# Patient Record
Sex: FEMALE | Race: OTHER | HISPANIC OR LATINO | URBAN - METROPOLITAN AREA
[De-identification: names, ages, dates, MRNs, and addresses within clinical notes are randomized per-mention and may not be internally consistent; named-entity substitution may affect disease eponyms.]

---

## 2020-07-13 ENCOUNTER — EMERGENCY (EMERGENCY)
Facility: HOSPITAL | Age: 20
LOS: 1 days | Discharge: HOME | End: 2020-07-13
Attending: EMERGENCY MEDICINE | Admitting: EMERGENCY MEDICINE
Payer: MEDICAID

## 2020-07-13 VITALS
RESPIRATION RATE: 18 BRPM | TEMPERATURE: 98 F | DIASTOLIC BLOOD PRESSURE: 71 MMHG | HEART RATE: 90 BPM | SYSTOLIC BLOOD PRESSURE: 113 MMHG | OXYGEN SATURATION: 100 %

## 2020-07-13 VITALS
DIASTOLIC BLOOD PRESSURE: 85 MMHG | RESPIRATION RATE: 18 BRPM | HEART RATE: 108 BPM | OXYGEN SATURATION: 100 % | SYSTOLIC BLOOD PRESSURE: 126 MMHG | TEMPERATURE: 98 F

## 2020-07-13 LAB
ALBUMIN SERPL ELPH-MCNC: 4.4 G/DL — SIGNIFICANT CHANGE UP (ref 3.3–5)
ALP SERPL-CCNC: 81 U/L — SIGNIFICANT CHANGE UP (ref 40–120)
ALT FLD-CCNC: 18 U/L — SIGNIFICANT CHANGE UP (ref 4–33)
ANION GAP SERPL CALC-SCNC: 14 MMO/L — SIGNIFICANT CHANGE UP (ref 7–14)
APPEARANCE UR: SIGNIFICANT CHANGE UP
AST SERPL-CCNC: 20 U/L — SIGNIFICANT CHANGE UP (ref 4–32)
BACTERIA # UR AUTO: HIGH
BILIRUB SERPL-MCNC: 0.3 MG/DL — SIGNIFICANT CHANGE UP (ref 0.2–1.2)
BILIRUB UR-MCNC: NEGATIVE — SIGNIFICANT CHANGE UP
BLD GP AB SCN SERPL QL: NEGATIVE — SIGNIFICANT CHANGE UP
BLOOD UR QL VISUAL: NEGATIVE — SIGNIFICANT CHANGE UP
BUN SERPL-MCNC: 7 MG/DL — SIGNIFICANT CHANGE UP (ref 7–23)
CALCIUM SERPL-MCNC: 9.8 MG/DL — SIGNIFICANT CHANGE UP (ref 8.4–10.5)
CHLORIDE SERPL-SCNC: 101 MMOL/L — SIGNIFICANT CHANGE UP (ref 98–107)
CO2 SERPL-SCNC: 21 MMOL/L — LOW (ref 22–31)
COLOR SPEC: YELLOW — SIGNIFICANT CHANGE UP
CREAT SERPL-MCNC: 0.74 MG/DL — SIGNIFICANT CHANGE UP (ref 0.5–1.3)
GLUCOSE SERPL-MCNC: 89 MG/DL — SIGNIFICANT CHANGE UP (ref 70–99)
GLUCOSE UR-MCNC: NEGATIVE — SIGNIFICANT CHANGE UP
HCG SERPL-ACNC: SIGNIFICANT CHANGE UP MIU/ML
HCT VFR BLD CALC: 38.8 % — SIGNIFICANT CHANGE UP (ref 34.5–45)
HGB BLD-MCNC: 12.7 G/DL — SIGNIFICANT CHANGE UP (ref 11.5–15.5)
KETONES UR-MCNC: SIGNIFICANT CHANGE UP
LEUKOCYTE ESTERASE UR-ACNC: SIGNIFICANT CHANGE UP
MCHC RBC-ENTMCNC: 28.9 PG — SIGNIFICANT CHANGE UP (ref 27–34)
MCHC RBC-ENTMCNC: 32.7 % — SIGNIFICANT CHANGE UP (ref 32–36)
MCV RBC AUTO: 88.2 FL — SIGNIFICANT CHANGE UP (ref 80–100)
NITRITE UR-MCNC: POSITIVE — HIGH
NRBC # FLD: 0 K/UL — SIGNIFICANT CHANGE UP (ref 0–0)
PH UR: 6 — SIGNIFICANT CHANGE UP (ref 5–8)
PLATELET # BLD AUTO: 383 K/UL — SIGNIFICANT CHANGE UP (ref 150–400)
PMV BLD: 9.6 FL — SIGNIFICANT CHANGE UP (ref 7–13)
POTASSIUM SERPL-MCNC: 4.5 MMOL/L — SIGNIFICANT CHANGE UP (ref 3.5–5.3)
POTASSIUM SERPL-SCNC: 4.5 MMOL/L — SIGNIFICANT CHANGE UP (ref 3.5–5.3)
PROT SERPL-MCNC: 7.2 G/DL — SIGNIFICANT CHANGE UP (ref 6–8.3)
PROT UR-MCNC: 10 — SIGNIFICANT CHANGE UP
RBC # BLD: 4.4 M/UL — SIGNIFICANT CHANGE UP (ref 3.8–5.2)
RBC # FLD: 11.9 % — SIGNIFICANT CHANGE UP (ref 10.3–14.5)
RBC CASTS # UR COMP ASSIST: SIGNIFICANT CHANGE UP (ref 0–?)
RH IG SCN BLD-IMP: POSITIVE — SIGNIFICANT CHANGE UP
SODIUM SERPL-SCNC: 136 MMOL/L — SIGNIFICANT CHANGE UP (ref 135–145)
SP GR SPEC: 1.02 — SIGNIFICANT CHANGE UP (ref 1–1.04)
SQUAMOUS # UR AUTO: SIGNIFICANT CHANGE UP
UROBILINOGEN FLD QL: NORMAL — SIGNIFICANT CHANGE UP
WBC # BLD: 9.02 K/UL — SIGNIFICANT CHANGE UP (ref 3.8–10.5)
WBC # FLD AUTO: 9.02 K/UL — SIGNIFICANT CHANGE UP (ref 3.8–10.5)
WBC UR QL: HIGH (ref 0–?)

## 2020-07-13 PROCEDURE — 76817 TRANSVAGINAL US OBSTETRIC: CPT | Mod: 26

## 2020-07-13 PROCEDURE — 99284 EMERGENCY DEPT VISIT MOD MDM: CPT

## 2020-07-13 RX ORDER — CEPHALEXIN 500 MG
1 CAPSULE ORAL
Qty: 14 | Refills: 0
Start: 2020-07-13 | End: 2020-07-19

## 2020-07-13 NOTE — ED PROVIDER NOTE - NSFOLLOWUPINSTRUCTIONS_ED_ALL_ED_FT
Follow up with OB/GYN in the next 1-2 weeks  I have attached referral number [phone]  Return for bleeding, increasing pain.  Take keflex 500mg tab twice a day for the next week.  Return for fevers, vomiting, or any other complaints in which you feel you require immediate care.

## 2020-07-13 NOTE — ED ADULT TRIAGE NOTE - CHIEF COMPLAINT QUOTE
pt c/o pelvic pain. reports +pregnancy test yesterday. LMP 5/29. denies vaginal bleeding. pt is hearing impaired. requested her mother to stay for interpretation.

## 2020-07-13 NOTE — ED ADULT NURSE NOTE - NSIMPLEMENTINTERV_GEN_ALL_ED
Implemented All Universal Safety Interventions:  [place] to call system. Call bell, personal items and telephone within reach. Instruct patient to call for assistance. Room bathroom lighting operational. Non-slip footwear when patient is off stretcher. Physically safe environment: no spills, clutter or unnecessary equipment. Stretcher in lowest position, wheels locked, appropriate side rails in place.

## 2020-07-13 NOTE — ED ADULT NURSE NOTE - OBJECTIVE STATEMENT
Pt with co pelvic pain x few days . Pt denies vaginal bleeding no nausea or vomiting seen. vs wnl iv placed blood and urine collected. Pt awaiting u/s. Pt is hearing impaired parent at bedside. Pt does not appear uncomfortable .

## 2020-07-13 NOTE — ED PROVIDER NOTE - PATIENT PORTAL LINK FT
You can access the FollowMyHealth Patient Portal offered by [facility] by registering at the following website: [email or web address]. By joining [email or web address]’s FollowMyHealth portal, you will also be able to view your health information using other applications (apps) compatible with our system.

## 2020-07-13 NOTE — ED PROVIDER NOTE - OBJECTIVE STATEMENT
20 yr old female with no sig PMH is [place], mother speaks Occitan and can communicate with pt and  services used.  Pt noted she was 2-3 weeks late in having menstrual cycle and took home pregnancy test which was positive.  Complaint of lower abdominal pain.  Denies dysuria, hematuria, vaginal bleeding.

## 2020-08-11 ENCOUNTER — LABORATORY RESULT (OUTPATIENT)
Age: 20
End: 2020-08-11

## 2020-08-11 ENCOUNTER — RESULT REVIEW (OUTPATIENT)
Age: 20
End: 2020-08-11

## 2020-08-11 ENCOUNTER — NON-APPOINTMENT (OUTPATIENT)
Age: 20
End: 2020-08-11

## 2020-08-11 ENCOUNTER — APPOINTMENT (OUTPATIENT)
Dept: OBGYN | Facility: HOSPITAL | Age: 20
End: 2020-08-11

## 2020-08-11 ENCOUNTER — OUTPATIENT (OUTPATIENT)
Dept: OUTPATIENT SERVICES | Facility: HOSPITAL | Age: 20
LOS: 1 days | End: 2020-08-11
Payer: MEDICAID

## 2020-08-11 VITALS
TEMPERATURE: 97.5 F | DIASTOLIC BLOOD PRESSURE: 89 MMHG | SYSTOLIC BLOOD PRESSURE: 123 MMHG | BODY MASS INDEX: 30.78 KG/M2 | HEART RATE: 112 BPM | WEIGHT: 163 LBS | HEIGHT: 61 IN

## 2020-08-11 VITALS
SYSTOLIC BLOOD PRESSURE: 104 MMHG | HEART RATE: 112 BPM | DIASTOLIC BLOOD PRESSURE: 68 MMHG | BODY MASS INDEX: 30.78 KG/M2 | TEMPERATURE: 97.5 F | HEIGHT: 61 IN | WEIGHT: 163 LBS

## 2020-08-11 DIAGNOSIS — Z86.69 PERSONAL HISTORY OF OTHER DISEASES OF THE NERVOUS SYSTEM AND SENSE ORGANS: ICD-10-CM

## 2020-08-11 LAB
ALBUMIN SERPL ELPH-MCNC: 4.6 G/DL — SIGNIFICANT CHANGE UP (ref 3.3–5)
ALP SERPL-CCNC: 76 U/L — SIGNIFICANT CHANGE UP (ref 40–120)
ALT FLD-CCNC: 26 U/L — SIGNIFICANT CHANGE UP (ref 4–33)
ANION GAP SERPL CALC-SCNC: 21 MMO/L — HIGH (ref 7–14)
APPEARANCE UR: CLEAR — SIGNIFICANT CHANGE UP
AST SERPL-CCNC: 23 U/L — SIGNIFICANT CHANGE UP (ref 4–32)
BACTERIA # UR AUTO: SIGNIFICANT CHANGE UP
BASOPHILS # BLD AUTO: 0.02 K/UL — SIGNIFICANT CHANGE UP (ref 0–0.2)
BASOPHILS NFR BLD AUTO: 0.2 % — SIGNIFICANT CHANGE UP (ref 0–2)
BILIRUB SERPL-MCNC: 0.3 MG/DL — SIGNIFICANT CHANGE UP (ref 0.2–1.2)
BILIRUB UR-MCNC: NEGATIVE — SIGNIFICANT CHANGE UP
BLD GP AB SCN SERPL QL: NEGATIVE — SIGNIFICANT CHANGE UP
BLOOD UR QL VISUAL: NEGATIVE — SIGNIFICANT CHANGE UP
BUN SERPL-MCNC: 8 MG/DL — SIGNIFICANT CHANGE UP (ref 7–23)
CALCIUM SERPL-MCNC: 10.1 MG/DL — SIGNIFICANT CHANGE UP (ref 8.4–10.5)
CHLORIDE SERPL-SCNC: 98 MMOL/L — SIGNIFICANT CHANGE UP (ref 98–107)
CO2 SERPL-SCNC: 19 MMOL/L — LOW (ref 22–31)
COLOR SPEC: YELLOW — SIGNIFICANT CHANGE UP
CREAT SERPL-MCNC: 0.53 MG/DL — SIGNIFICANT CHANGE UP (ref 0.5–1.3)
EOSINOPHIL # BLD AUTO: 0.05 K/UL — SIGNIFICANT CHANGE UP (ref 0–0.5)
EOSINOPHIL NFR BLD AUTO: 0.5 % — SIGNIFICANT CHANGE UP (ref 0–6)
GLUCOSE SERPL-MCNC: 41 MG/DL — CRITICAL LOW (ref 70–99)
GLUCOSE UR-MCNC: NEGATIVE — SIGNIFICANT CHANGE UP
HCT VFR BLD CALC: 40.5 % — SIGNIFICANT CHANGE UP (ref 34.5–45)
HGB BLD-MCNC: 13.1 G/DL — SIGNIFICANT CHANGE UP (ref 11.5–15.5)
HIV COMBO RESULT: SIGNIFICANT CHANGE UP
HIV1+2 AB SPEC QL: SIGNIFICANT CHANGE UP
IMM GRANULOCYTES NFR BLD AUTO: 0.2 % — SIGNIFICANT CHANGE UP (ref 0–1.5)
KETONES UR-MCNC: NEGATIVE — SIGNIFICANT CHANGE UP
LEUKOCYTE ESTERASE UR-ACNC: NEGATIVE — SIGNIFICANT CHANGE UP
LYMPHOCYTES # BLD AUTO: 2.47 K/UL — SIGNIFICANT CHANGE UP (ref 1–3.3)
LYMPHOCYTES # BLD AUTO: 24.9 % — SIGNIFICANT CHANGE UP (ref 13–44)
MCHC RBC-ENTMCNC: 30 PG — SIGNIFICANT CHANGE UP (ref 27–34)
MCHC RBC-ENTMCNC: 32.3 % — SIGNIFICANT CHANGE UP (ref 32–36)
MCV RBC AUTO: 92.7 FL — SIGNIFICANT CHANGE UP (ref 80–100)
MONOCYTES # BLD AUTO: 0.61 K/UL — SIGNIFICANT CHANGE UP (ref 0–0.9)
MONOCYTES NFR BLD AUTO: 6.1 % — SIGNIFICANT CHANGE UP (ref 2–14)
NEUTROPHILS # BLD AUTO: 6.76 K/UL — SIGNIFICANT CHANGE UP (ref 1.8–7.4)
NEUTROPHILS NFR BLD AUTO: 68.1 % — SIGNIFICANT CHANGE UP (ref 43–77)
NITRITE UR-MCNC: NEGATIVE — SIGNIFICANT CHANGE UP
NRBC # FLD: 0 K/UL — SIGNIFICANT CHANGE UP (ref 0–0)
PH UR: 5.5 — SIGNIFICANT CHANGE UP (ref 5–8)
PLATELET # BLD AUTO: 372 K/UL — SIGNIFICANT CHANGE UP (ref 150–400)
PMV BLD: 9.9 FL — SIGNIFICANT CHANGE UP (ref 7–13)
POTASSIUM SERPL-MCNC: 3.2 MMOL/L — LOW (ref 3.5–5.3)
POTASSIUM SERPL-SCNC: 3.2 MMOL/L — LOW (ref 3.5–5.3)
PROT SERPL-MCNC: 7.9 G/DL — SIGNIFICANT CHANGE UP (ref 6–8.3)
PROT UR-MCNC: 20 — SIGNIFICANT CHANGE UP
RBC # BLD: 4.37 M/UL — SIGNIFICANT CHANGE UP (ref 3.8–5.2)
RBC # FLD: 12.5 % — SIGNIFICANT CHANGE UP (ref 10.3–14.5)
RBC CASTS # UR COMP ASSIST: HIGH (ref 0–?)
RH IG SCN BLD-IMP: POSITIVE — SIGNIFICANT CHANGE UP
SODIUM SERPL-SCNC: 138 MMOL/L — SIGNIFICANT CHANGE UP (ref 135–145)
SP GR SPEC: 1.03 — SIGNIFICANT CHANGE UP (ref 1–1.04)
SQUAMOUS # UR AUTO: SIGNIFICANT CHANGE UP
URATE SERPL-MCNC: 2.3 MG/DL — LOW (ref 2.5–7)
UROBILINOGEN FLD QL: NORMAL — SIGNIFICANT CHANGE UP
WBC # BLD: 9.93 K/UL — SIGNIFICANT CHANGE UP (ref 3.8–10.5)
WBC # FLD AUTO: 9.93 K/UL — SIGNIFICANT CHANGE UP (ref 3.8–10.5)
WBC UR QL: HIGH (ref 0–?)

## 2020-08-11 PROCEDURE — G0452: CPT | Mod: 26

## 2020-08-11 PROCEDURE — 88141 CYTOPATH C/V INTERPRET: CPT

## 2020-08-12 LAB
24R-OH-CALCIDIOL SERPL-MCNC: 27.7 NG/ML — LOW (ref 30–80)
CULTURE RESULTS: SIGNIFICANT CHANGE UP
HBV SURFACE AG SER-ACNC: NONREACTIVE — SIGNIFICANT CHANGE UP
HCV AB S/CO SERPL IA: 0.14 S/CO — SIGNIFICANT CHANGE UP (ref 0–0.99)
HCV AB SERPL-IMP: SIGNIFICANT CHANGE UP
HGB A MFR BLD: 96.7 % — SIGNIFICANT CHANGE UP
HGB A2 MFR BLD: 96.7 % — HIGH (ref 2.4–3.5)
HGB ELECT COMMENT: SIGNIFICANT CHANGE UP
HGB F MFR BLD: 3 % — HIGH (ref 0–1.5)
HGB S MFR BLD: < 1 % — HIGH (ref 0–0)
MEV IGG SER-ACNC: <5 AU/ML — SIGNIFICANT CHANGE UP
MEV IGG+IGM SER-IMP: NEGATIVE — SIGNIFICANT CHANGE UP
RUBV IGG SER-ACNC: 1.6 INDEX — SIGNIFICANT CHANGE UP
RUBV IGG SER-IMP: POSITIVE — SIGNIFICANT CHANGE UP
SARS-COV-2 IGG SERPL QL IA: NEGATIVE — SIGNIFICANT CHANGE UP
SARS-COV-2 IGM SERPL IA-ACNC: <3.8 AU/ML — SIGNIFICANT CHANGE UP
SPECIMEN SOURCE: SIGNIFICANT CHANGE UP
T PALLIDUM AB TITR SER: NEGATIVE — SIGNIFICANT CHANGE UP
VZV IGG FLD QL IA: >4000 INDEX — SIGNIFICANT CHANGE UP
VZV IGG FLD QL IA: POSITIVE — SIGNIFICANT CHANGE UP

## 2020-08-13 LAB
GAMMA INTERFERON BACKGROUND BLD IA-ACNC: 0.03 IU/ML — SIGNIFICANT CHANGE UP
M TB IFN-G BLD-IMP: HIGH
M TB IFN-G CD4+ BCKGRND COR BLD-ACNC: -0.01 IU/ML — SIGNIFICANT CHANGE UP
M TB IFN-G CD4+CD8+ BCKGRND COR BLD-ACNC: -0.01 IU/ML — SIGNIFICANT CHANGE UP
QUANT TB PLUS MITOGEN MINUS NIL: -0.02 IU/ML — SIGNIFICANT CHANGE UP

## 2020-08-14 LAB
C TRACH RRNA SPEC QL NAA+PROBE: DETECTED — SIGNIFICANT CHANGE UP
LEAD SERPL-MCNC: < 1 UG/DL — SIGNIFICANT CHANGE UP (ref 0–4)
N GONORRHOEA RRNA SPEC QL NAA+PROBE: SIGNIFICANT CHANGE UP
SPECIMEN SOURCE: SIGNIFICANT CHANGE UP

## 2020-08-17 LAB
CFTR MUT ANL BLD/T: NEGATIVE — SIGNIFICANT CHANGE UP
CYTOLOGY SPEC DOC CYTO: SIGNIFICANT CHANGE UP

## 2020-08-20 ENCOUNTER — APPOINTMENT (OUTPATIENT)
Dept: PEDIATRIC MEDICAL GENETICS | Facility: CLINIC | Age: 20
End: 2020-08-20

## 2020-08-20 DIAGNOSIS — H90.5 UNSPECIFIED SENSORINEURAL HEARING LOSS: ICD-10-CM

## 2020-08-20 DIAGNOSIS — Z34.01 ENCOUNTER FOR SUPERVISION OF NORMAL FIRST PREGNANCY, FIRST TRIMESTER: ICD-10-CM

## 2020-08-20 DIAGNOSIS — Z34.90 ENCOUNTER FOR SUPERVISION OF NORMAL PREGNANCY, UNSPECIFIED, UNSPECIFIED TRIMESTER: ICD-10-CM

## 2020-08-28 ENCOUNTER — APPOINTMENT (OUTPATIENT)
Dept: OBGYN | Facility: HOSPITAL | Age: 20
End: 2020-08-28

## 2020-08-28 ENCOUNTER — NON-APPOINTMENT (OUTPATIENT)
Age: 20
End: 2020-08-28

## 2020-08-28 ENCOUNTER — LABORATORY RESULT (OUTPATIENT)
Age: 20
End: 2020-08-28

## 2020-08-28 ENCOUNTER — OUTPATIENT (OUTPATIENT)
Dept: OUTPATIENT SERVICES | Facility: HOSPITAL | Age: 20
LOS: 1 days | End: 2020-08-28

## 2020-08-28 ENCOUNTER — APPOINTMENT (OUTPATIENT)
Dept: ANTEPARTUM | Facility: CLINIC | Age: 20
End: 2020-08-28
Payer: MEDICAID

## 2020-08-28 ENCOUNTER — ASOB RESULT (OUTPATIENT)
Age: 20
End: 2020-08-28

## 2020-08-28 VITALS
HEART RATE: 106 BPM | SYSTOLIC BLOOD PRESSURE: 122 MMHG | WEIGHT: 162 LBS | DIASTOLIC BLOOD PRESSURE: 78 MMHG | TEMPERATURE: 98 F

## 2020-08-28 LAB
POTASSIUM SERPL-MCNC: 3.8 MMOL/L — SIGNIFICANT CHANGE UP (ref 3.5–5.3)
POTASSIUM SERPL-SCNC: 3.8 MMOL/L — SIGNIFICANT CHANGE UP (ref 3.5–5.3)

## 2020-08-28 PROCEDURE — 76813 OB US NUCHAL MEAS 1 GEST: CPT | Mod: 26

## 2020-08-28 PROCEDURE — 76801 OB US < 14 WKS SINGLE FETUS: CPT | Mod: 26

## 2020-08-28 RX ORDER — UBIDECARENONE/VIT E ACET 100MG-5
25 MCG CAPSULE ORAL
Qty: 30 | Refills: 5 | Status: ACTIVE | COMMUNITY
Start: 2020-08-28 | End: 1900-01-01

## 2020-08-31 LAB
1ST TRIMESTER DATA: SIGNIFICANT CHANGE UP
ADDENDUM DOC: SIGNIFICANT CHANGE UP
AFP SERPL-ACNC: SIGNIFICANT CHANGE UP
B-HCG FREE SERPL-MCNC: SIGNIFICANT CHANGE UP
CLINICAL BIOCHEMIST REVIEW: SIGNIFICANT CHANGE UP
CLINICAL BIOCHEMIST REVIEW: SIGNIFICANT CHANGE UP
DEMOGRAPHIC DATA: SIGNIFICANT CHANGE UP
NT: SIGNIFICANT CHANGE UP
PAPP-A SERPL-ACNC: SIGNIFICANT CHANGE UP
SCREEN-FOOTER: SIGNIFICANT CHANGE UP
SCREEN-RECOMMENDATIONS: SIGNIFICANT CHANGE UP

## 2020-09-02 DIAGNOSIS — E87.6 HYPOKALEMIA: ICD-10-CM

## 2020-09-02 DIAGNOSIS — R79.89 OTHER SPECIFIED ABNORMAL FINDINGS OF BLOOD CHEMISTRY: ICD-10-CM

## 2020-09-02 DIAGNOSIS — Z34.92 ENCOUNTER FOR SUPERVISION OF NORMAL PREGNANCY, UNSPECIFIED, SECOND TRIMESTER: ICD-10-CM

## 2020-09-25 ENCOUNTER — APPOINTMENT (OUTPATIENT)
Dept: OBGYN | Facility: HOSPITAL | Age: 20
End: 2020-09-25

## 2020-10-07 ENCOUNTER — APPOINTMENT (OUTPATIENT)
Dept: OBGYN | Facility: HOSPITAL | Age: 20
End: 2020-10-07

## 2020-10-08 ENCOUNTER — LABORATORY RESULT (OUTPATIENT)
Age: 20
End: 2020-10-08

## 2020-10-08 ENCOUNTER — NON-APPOINTMENT (OUTPATIENT)
Age: 20
End: 2020-10-08

## 2020-10-08 ENCOUNTER — OUTPATIENT (OUTPATIENT)
Dept: OUTPATIENT SERVICES | Facility: HOSPITAL | Age: 20
LOS: 1 days | End: 2020-10-08

## 2020-10-08 ENCOUNTER — MED ADMIN CHARGE (OUTPATIENT)
Age: 20
End: 2020-10-08

## 2020-10-08 ENCOUNTER — APPOINTMENT (OUTPATIENT)
Dept: OBGYN | Facility: HOSPITAL | Age: 20
End: 2020-10-08

## 2020-10-08 VITALS — WEIGHT: 166 LBS | SYSTOLIC BLOOD PRESSURE: 107 MMHG | DIASTOLIC BLOOD PRESSURE: 71 MMHG

## 2020-10-08 VITALS — TEMPERATURE: 97.7 F

## 2020-10-09 DIAGNOSIS — Z23 ENCOUNTER FOR IMMUNIZATION: ICD-10-CM

## 2020-10-09 DIAGNOSIS — H90.5 UNSPECIFIED SENSORINEURAL HEARING LOSS: ICD-10-CM

## 2020-10-09 DIAGNOSIS — Z34.92 ENCOUNTER FOR SUPERVISION OF NORMAL PREGNANCY, UNSPECIFIED, SECOND TRIMESTER: ICD-10-CM

## 2020-10-09 LAB
C TRACH RRNA SPEC QL NAA+PROBE: SIGNIFICANT CHANGE UP
N GONORRHOEA RRNA SPEC QL NAA+PROBE: SIGNIFICANT CHANGE UP
SPECIMEN SOURCE: SIGNIFICANT CHANGE UP

## 2020-10-10 LAB
GAMMA INTERFERON BACKGROUND BLD IA-ACNC: 0.01 IU/ML — SIGNIFICANT CHANGE UP
M TB IFN-G BLD-IMP: HIGH
M TB IFN-G CD4+ BCKGRND COR BLD-ACNC: 0 IU/ML — SIGNIFICANT CHANGE UP
M TB IFN-G CD4+CD8+ BCKGRND COR BLD-ACNC: 0 IU/ML — SIGNIFICANT CHANGE UP
QUANT TB PLUS MITOGEN MINUS NIL: 0.32 IU/ML — SIGNIFICANT CHANGE UP

## 2020-10-12 LAB
1ST TRIMESTER DATA: SIGNIFICANT CHANGE UP
2ND TRIMESTER DATA: SIGNIFICANT CHANGE UP
AFP SERPL-ACNC: SIGNIFICANT CHANGE UP
AFP SERPL-ACNC: SIGNIFICANT CHANGE UP
B-HCG FREE SERPL-MCNC: SIGNIFICANT CHANGE UP
B-HCG FREE SERPL-MCNC: SIGNIFICANT CHANGE UP
CLINICAL BIOCHEMIST REVIEW: SIGNIFICANT CHANGE UP
DEMOGRAPHIC DATA: SIGNIFICANT CHANGE UP
INHIBIN A SERPL-MCNC: SIGNIFICANT CHANGE UP
NT: SIGNIFICANT CHANGE UP
PAPP-A SERPL-ACNC: SIGNIFICANT CHANGE UP
SCREEN-FOOTER: SIGNIFICANT CHANGE UP
U ESTRIOL SERPL-SCNC: SIGNIFICANT CHANGE UP

## 2020-10-16 ENCOUNTER — APPOINTMENT (OUTPATIENT)
Dept: ANTEPARTUM | Facility: CLINIC | Age: 20
End: 2020-10-16
Payer: MEDICAID

## 2020-10-16 ENCOUNTER — ASOB RESULT (OUTPATIENT)
Age: 20
End: 2020-10-16

## 2020-10-16 PROCEDURE — 76811 OB US DETAILED SNGL FETUS: CPT | Mod: 26

## 2020-11-05 ENCOUNTER — NON-APPOINTMENT (OUTPATIENT)
Age: 20
End: 2020-11-05

## 2020-11-05 ENCOUNTER — APPOINTMENT (OUTPATIENT)
Dept: OBGYN | Facility: HOSPITAL | Age: 20
End: 2020-11-05

## 2020-11-05 ENCOUNTER — OUTPATIENT (OUTPATIENT)
Dept: OUTPATIENT SERVICES | Facility: HOSPITAL | Age: 20
LOS: 1 days | End: 2020-11-05

## 2020-11-05 VITALS
RESPIRATION RATE: 18 BRPM | DIASTOLIC BLOOD PRESSURE: 78 MMHG | WEIGHT: 169 LBS | SYSTOLIC BLOOD PRESSURE: 123 MMHG | HEART RATE: 117 BPM | TEMPERATURE: 97.6 F

## 2020-12-03 ENCOUNTER — LABORATORY RESULT (OUTPATIENT)
Age: 20
End: 2020-12-03

## 2020-12-03 ENCOUNTER — MED ADMIN CHARGE (OUTPATIENT)
Age: 20
End: 2020-12-03

## 2020-12-03 ENCOUNTER — OUTPATIENT (OUTPATIENT)
Dept: OUTPATIENT SERVICES | Facility: HOSPITAL | Age: 20
LOS: 1 days | End: 2020-12-03

## 2020-12-03 ENCOUNTER — NON-APPOINTMENT (OUTPATIENT)
Age: 20
End: 2020-12-03

## 2020-12-03 ENCOUNTER — APPOINTMENT (OUTPATIENT)
Dept: OBGYN | Facility: HOSPITAL | Age: 20
End: 2020-12-03

## 2020-12-03 VITALS
WEIGHT: 172 LBS | SYSTOLIC BLOOD PRESSURE: 115 MMHG | HEART RATE: 104 BPM | BODY MASS INDEX: 32.47 KG/M2 | DIASTOLIC BLOOD PRESSURE: 81 MMHG | HEIGHT: 61 IN

## 2020-12-03 DIAGNOSIS — Z34.01 ENCOUNTER FOR SUPERVISION OF NORMAL FIRST PREGNANCY, FIRST TRIMESTER: ICD-10-CM

## 2020-12-03 DIAGNOSIS — Z34.92 ENCOUNTER FOR SUPERVISION OF NORMAL PREGNANCY, UNSPECIFIED, SECOND TRIMESTER: ICD-10-CM

## 2020-12-03 LAB
24R-OH-CALCIDIOL SERPL-MCNC: 31.1 NG/ML — SIGNIFICANT CHANGE UP (ref 30–80)
BASOPHILS # BLD AUTO: 0.01 K/UL — SIGNIFICANT CHANGE UP (ref 0–0.2)
BASOPHILS NFR BLD AUTO: 0.1 % — SIGNIFICANT CHANGE UP (ref 0–2)
EOSINOPHIL # BLD AUTO: 0.04 K/UL — SIGNIFICANT CHANGE UP (ref 0–0.5)
EOSINOPHIL NFR BLD AUTO: 0.4 % — SIGNIFICANT CHANGE UP (ref 0–6)
GLUCOSE PRE/P GLC SERPL-SCNC: 108 — SIGNIFICANT CHANGE UP (ref 65–115)
HCT VFR BLD CALC: 33.7 % — LOW (ref 34.5–45)
HGB BLD-MCNC: 11 G/DL — LOW (ref 11.5–15.5)
IMM GRANULOCYTES NFR BLD AUTO: 0.6 % — SIGNIFICANT CHANGE UP (ref 0–1.5)
LYMPHOCYTES # BLD AUTO: 2.34 K/UL — SIGNIFICANT CHANGE UP (ref 1–3.3)
LYMPHOCYTES # BLD AUTO: 26.1 % — SIGNIFICANT CHANGE UP (ref 13–44)
MCHC RBC-ENTMCNC: 29.8 PG — SIGNIFICANT CHANGE UP (ref 27–34)
MCHC RBC-ENTMCNC: 32.6 % — SIGNIFICANT CHANGE UP (ref 32–36)
MCV RBC AUTO: 91.3 FL — SIGNIFICANT CHANGE UP (ref 80–100)
MONOCYTES # BLD AUTO: 0.67 K/UL — SIGNIFICANT CHANGE UP (ref 0–0.9)
MONOCYTES NFR BLD AUTO: 7.5 % — SIGNIFICANT CHANGE UP (ref 2–14)
NEUTROPHILS # BLD AUTO: 5.87 K/UL — SIGNIFICANT CHANGE UP (ref 1.8–7.4)
NEUTROPHILS NFR BLD AUTO: 65.3 % — SIGNIFICANT CHANGE UP (ref 43–77)
NRBC # FLD: 0 K/UL — SIGNIFICANT CHANGE UP (ref 0–0)
PLATELET # BLD AUTO: 300 K/UL — SIGNIFICANT CHANGE UP (ref 150–400)
PMV BLD: 9.4 FL — SIGNIFICANT CHANGE UP (ref 7–13)
RBC # BLD: 3.69 M/UL — LOW (ref 3.8–5.2)
RBC # FLD: 12.6 % — SIGNIFICANT CHANGE UP (ref 10.3–14.5)
WBC # BLD: 8.98 K/UL — SIGNIFICANT CHANGE UP (ref 3.8–10.5)
WBC # FLD AUTO: 8.98 K/UL — SIGNIFICANT CHANGE UP (ref 3.8–10.5)

## 2020-12-04 LAB — T PALLIDUM AB TITR SER: NEGATIVE — SIGNIFICANT CHANGE UP

## 2020-12-08 DIAGNOSIS — Z34.03 ENCOUNTER FOR SUPERVISION OF NORMAL FIRST PREGNANCY, THIRD TRIMESTER: ICD-10-CM

## 2020-12-08 DIAGNOSIS — Z23 ENCOUNTER FOR IMMUNIZATION: ICD-10-CM

## 2020-12-28 ENCOUNTER — OUTPATIENT (OUTPATIENT)
Dept: OUTPATIENT SERVICES | Facility: HOSPITAL | Age: 20
LOS: 1 days | End: 2020-12-28
Payer: MEDICAID

## 2020-12-28 ENCOUNTER — ASOB RESULT (OUTPATIENT)
Age: 20
End: 2020-12-28

## 2020-12-28 ENCOUNTER — APPOINTMENT (OUTPATIENT)
Dept: RADIOLOGY | Facility: HOSPITAL | Age: 20
End: 2020-12-28

## 2020-12-28 ENCOUNTER — APPOINTMENT (OUTPATIENT)
Dept: ANTEPARTUM | Facility: CLINIC | Age: 20
End: 2020-12-28
Payer: MEDICAID

## 2020-12-28 ENCOUNTER — APPOINTMENT (OUTPATIENT)
Dept: OBGYN | Facility: HOSPITAL | Age: 20
End: 2020-12-28

## 2020-12-28 ENCOUNTER — NON-APPOINTMENT (OUTPATIENT)
Age: 20
End: 2020-12-28

## 2020-12-28 ENCOUNTER — RESULT REVIEW (OUTPATIENT)
Age: 20
End: 2020-12-28

## 2020-12-28 VITALS
WEIGHT: 173 LBS | HEART RATE: 105 BPM | TEMPERATURE: 98.1 F | SYSTOLIC BLOOD PRESSURE: 115 MMHG | DIASTOLIC BLOOD PRESSURE: 83 MMHG

## 2020-12-28 PROCEDURE — 76819 FETAL BIOPHYS PROFIL W/O NST: CPT | Mod: 26

## 2020-12-28 PROCEDURE — 71045 X-RAY EXAM CHEST 1 VIEW: CPT | Mod: 26

## 2020-12-28 PROCEDURE — 76816 OB US FOLLOW-UP PER FETUS: CPT | Mod: 26

## 2020-12-29 DIAGNOSIS — Z34.92 ENCOUNTER FOR SUPERVISION OF NORMAL PREGNANCY, UNSPECIFIED, SECOND TRIMESTER: ICD-10-CM

## 2021-01-20 ENCOUNTER — OUTPATIENT (OUTPATIENT)
Dept: OUTPATIENT SERVICES | Facility: HOSPITAL | Age: 21
LOS: 1 days | End: 2021-01-20

## 2021-01-20 ENCOUNTER — NON-APPOINTMENT (OUTPATIENT)
Age: 21
End: 2021-01-20

## 2021-01-20 ENCOUNTER — APPOINTMENT (OUTPATIENT)
Dept: OBGYN | Facility: HOSPITAL | Age: 21
End: 2021-01-20

## 2021-01-20 VITALS
WEIGHT: 180 LBS | HEIGHT: 60 IN | HEART RATE: 95 BPM | DIASTOLIC BLOOD PRESSURE: 79 MMHG | TEMPERATURE: 97.3 F | SYSTOLIC BLOOD PRESSURE: 117 MMHG | BODY MASS INDEX: 35.34 KG/M2

## 2021-01-27 DIAGNOSIS — Z34.03 ENCOUNTER FOR SUPERVISION OF NORMAL FIRST PREGNANCY, THIRD TRIMESTER: ICD-10-CM

## 2021-02-03 ENCOUNTER — NON-APPOINTMENT (OUTPATIENT)
Age: 21
End: 2021-02-03

## 2021-02-03 ENCOUNTER — APPOINTMENT (OUTPATIENT)
Dept: OBGYN | Facility: HOSPITAL | Age: 21
End: 2021-02-03

## 2021-02-03 ENCOUNTER — OUTPATIENT (OUTPATIENT)
Dept: OUTPATIENT SERVICES | Facility: HOSPITAL | Age: 21
LOS: 1 days | End: 2021-02-03

## 2021-02-03 VITALS
BODY MASS INDEX: 35.34 KG/M2 | HEART RATE: 98 BPM | DIASTOLIC BLOOD PRESSURE: 87 MMHG | HEIGHT: 60 IN | WEIGHT: 180 LBS | SYSTOLIC BLOOD PRESSURE: 110 MMHG | TEMPERATURE: 97.7 F

## 2021-02-04 DIAGNOSIS — Z34.03 ENCOUNTER FOR SUPERVISION OF NORMAL FIRST PREGNANCY, THIRD TRIMESTER: ICD-10-CM

## 2021-02-17 ENCOUNTER — APPOINTMENT (OUTPATIENT)
Dept: OBGYN | Facility: HOSPITAL | Age: 21
End: 2021-02-17

## 2021-02-17 ENCOUNTER — RESULT REVIEW (OUTPATIENT)
Age: 21
End: 2021-02-17

## 2021-02-17 ENCOUNTER — NON-APPOINTMENT (OUTPATIENT)
Age: 21
End: 2021-02-17

## 2021-02-17 ENCOUNTER — OUTPATIENT (OUTPATIENT)
Dept: OUTPATIENT SERVICES | Facility: HOSPITAL | Age: 21
LOS: 1 days | End: 2021-02-17

## 2021-02-17 VITALS
TEMPERATURE: 97.2 F | HEIGHT: 60 IN | WEIGHT: 181 LBS | BODY MASS INDEX: 35.53 KG/M2 | HEART RATE: 127 BPM | SYSTOLIC BLOOD PRESSURE: 124 MMHG | DIASTOLIC BLOOD PRESSURE: 78 MMHG

## 2021-02-17 VITALS — HEART RATE: 96 BPM

## 2021-02-19 LAB
CULTURE RESULTS: SIGNIFICANT CHANGE UP
SPECIMEN SOURCE: SIGNIFICANT CHANGE UP

## 2021-02-23 DIAGNOSIS — Z34.03 ENCOUNTER FOR SUPERVISION OF NORMAL FIRST PREGNANCY, THIRD TRIMESTER: ICD-10-CM

## 2021-02-24 ENCOUNTER — APPOINTMENT (OUTPATIENT)
Dept: ANTEPARTUM | Facility: HOSPITAL | Age: 21
End: 2021-02-24
Payer: MEDICAID

## 2021-02-24 ENCOUNTER — APPOINTMENT (OUTPATIENT)
Dept: OBGYN | Facility: HOSPITAL | Age: 21
End: 2021-02-24

## 2021-02-24 ENCOUNTER — ASOB RESULT (OUTPATIENT)
Age: 21
End: 2021-02-24

## 2021-02-24 ENCOUNTER — APPOINTMENT (OUTPATIENT)
Dept: ANTEPARTUM | Facility: CLINIC | Age: 21
End: 2021-02-24
Payer: MEDICAID

## 2021-02-24 ENCOUNTER — OUTPATIENT (OUTPATIENT)
Dept: OUTPATIENT SERVICES | Facility: HOSPITAL | Age: 21
LOS: 1 days | End: 2021-02-24

## 2021-02-24 PROCEDURE — 76816 OB US FOLLOW-UP PER FETUS: CPT | Mod: 26

## 2021-02-24 PROCEDURE — 59412 ANTEPARTUM MANIPULATION: CPT

## 2021-02-24 PROCEDURE — 76818 FETAL BIOPHYS PROFILE W/NST: CPT | Mod: 26

## 2021-02-25 ENCOUNTER — APPOINTMENT (OUTPATIENT)
Dept: ANTEPARTUM | Facility: HOSPITAL | Age: 21
End: 2021-02-25
Payer: MEDICAID

## 2021-02-25 ENCOUNTER — OUTPATIENT (OUTPATIENT)
Dept: OUTPATIENT SERVICES | Facility: HOSPITAL | Age: 21
LOS: 1 days | End: 2021-02-25

## 2021-02-25 ENCOUNTER — NON-APPOINTMENT (OUTPATIENT)
Age: 21
End: 2021-02-25

## 2021-02-25 ENCOUNTER — ASOB RESULT (OUTPATIENT)
Age: 21
End: 2021-02-25

## 2021-02-25 ENCOUNTER — APPOINTMENT (OUTPATIENT)
Dept: OBGYN | Facility: HOSPITAL | Age: 21
End: 2021-02-25

## 2021-02-25 VITALS
TEMPERATURE: 98.2 F | HEART RATE: 96 BPM | SYSTOLIC BLOOD PRESSURE: 116 MMHG | DIASTOLIC BLOOD PRESSURE: 68 MMHG | WEIGHT: 184.06 LBS

## 2021-02-25 PROCEDURE — 59025 FETAL NON-STRESS TEST: CPT | Mod: 26

## 2021-02-28 ENCOUNTER — TRANSCRIPTION ENCOUNTER (OUTPATIENT)
Age: 21
End: 2021-02-28

## 2021-02-28 ENCOUNTER — OUTPATIENT (OUTPATIENT)
Dept: OUTPATIENT SERVICES | Facility: HOSPITAL | Age: 21
LOS: 1 days | End: 2021-02-28

## 2021-02-28 DIAGNOSIS — Z20.822 CONTACT WITH AND (SUSPECTED) EXPOSURE TO COVID-19: ICD-10-CM

## 2021-02-28 LAB — SARS-COV-2 RNA SPEC QL NAA+PROBE: SIGNIFICANT CHANGE UP

## 2021-02-28 RX ORDER — SODIUM CHLORIDE 9 MG/ML
1000 INJECTION, SOLUTION INTRAVENOUS
Refills: 0 | Status: DISCONTINUED | OUTPATIENT
Start: 2021-03-01 | End: 2021-03-01

## 2021-03-01 ENCOUNTER — INPATIENT (INPATIENT)
Facility: HOSPITAL | Age: 21
LOS: 1 days | Discharge: HOME HEALTH | End: 2021-03-03
Attending: SPECIALIST | Admitting: SPECIALIST
Payer: MEDICAID

## 2021-03-01 ENCOUNTER — TRANSCRIPTION ENCOUNTER (OUTPATIENT)
Age: 21
End: 2021-03-01

## 2021-03-01 VITALS — DIASTOLIC BLOOD PRESSURE: 80 MMHG | HEART RATE: 116 BPM | SYSTOLIC BLOOD PRESSURE: 123 MMHG

## 2021-03-01 DIAGNOSIS — H90.5 UNSPECIFIED SENSORINEURAL HEARING LOSS: ICD-10-CM

## 2021-03-01 DIAGNOSIS — O32.2XX0 MATERNAL CARE FOR TRANSVERSE AND OBLIQUE LIE, NOT APPLICABLE OR UNSPECIFIED: ICD-10-CM

## 2021-03-01 DIAGNOSIS — Z34.03 ENCOUNTER FOR SUPERVISION OF NORMAL FIRST PREGNANCY, THIRD TRIMESTER: ICD-10-CM

## 2021-03-01 LAB
BLD GP AB SCN SERPL QL: NEGATIVE — SIGNIFICANT CHANGE UP
HCT VFR BLD CALC: 39.7 % — SIGNIFICANT CHANGE UP (ref 34.5–45)
HGB BLD-MCNC: 13.1 G/DL — SIGNIFICANT CHANGE UP (ref 11.5–15.5)
MCHC RBC-ENTMCNC: 29.9 PG — SIGNIFICANT CHANGE UP (ref 27–34)
MCHC RBC-ENTMCNC: 33 GM/DL — SIGNIFICANT CHANGE UP (ref 32–36)
MCV RBC AUTO: 90.6 FL — SIGNIFICANT CHANGE UP (ref 80–100)
NRBC # BLD: 0 /100 WBCS — SIGNIFICANT CHANGE UP
NRBC # FLD: 0 K/UL — SIGNIFICANT CHANGE UP
PLATELET # BLD AUTO: 241 K/UL — SIGNIFICANT CHANGE UP (ref 150–400)
RBC # BLD: 4.38 M/UL — SIGNIFICANT CHANGE UP (ref 3.8–5.2)
RBC # FLD: 13.3 % — SIGNIFICANT CHANGE UP (ref 10.3–14.5)
RH IG SCN BLD-IMP: POSITIVE — SIGNIFICANT CHANGE UP
T PALLIDUM AB TITR SER: NEGATIVE — SIGNIFICANT CHANGE UP
WBC # BLD: 9.68 K/UL — SIGNIFICANT CHANGE UP (ref 3.8–10.5)
WBC # FLD AUTO: 9.68 K/UL — SIGNIFICANT CHANGE UP (ref 3.8–10.5)

## 2021-03-01 RX ORDER — ACETAMINOPHEN 500 MG
3 TABLET ORAL
Qty: 0 | Refills: 0 | DISCHARGE
Start: 2021-03-01

## 2021-03-01 RX ORDER — ACETAMINOPHEN 500 MG
1000 TABLET ORAL ONCE
Refills: 0 | Status: COMPLETED | OUTPATIENT
Start: 2021-03-01 | End: 2021-03-01

## 2021-03-01 RX ORDER — ACETAMINOPHEN 500 MG
975 TABLET ORAL
Refills: 0 | Status: DISCONTINUED | OUTPATIENT
Start: 2021-03-01 | End: 2021-03-03

## 2021-03-01 RX ORDER — TETANUS TOXOID, REDUCED DIPHTHERIA TOXOID AND ACELLULAR PERTUSSIS VACCINE, ADSORBED 5; 2.5; 8; 8; 2.5 [IU]/.5ML; [IU]/.5ML; UG/.5ML; UG/.5ML; UG/.5ML
0.5 SUSPENSION INTRAMUSCULAR ONCE
Refills: 0 | Status: DISCONTINUED | OUTPATIENT
Start: 2021-03-01 | End: 2021-03-03

## 2021-03-01 RX ORDER — SIMETHICONE 80 MG/1
80 TABLET, CHEWABLE ORAL EVERY 4 HOURS
Refills: 0 | Status: DISCONTINUED | OUTPATIENT
Start: 2021-03-01 | End: 2021-03-03

## 2021-03-01 RX ORDER — FAMOTIDINE 10 MG/ML
20 INJECTION INTRAVENOUS ONCE
Refills: 0 | Status: COMPLETED | OUTPATIENT
Start: 2021-03-01 | End: 2021-03-01

## 2021-03-01 RX ORDER — KETOROLAC TROMETHAMINE 30 MG/ML
30 SYRINGE (ML) INJECTION EVERY 6 HOURS
Refills: 0 | Status: DISCONTINUED | OUTPATIENT
Start: 2021-03-01 | End: 2021-03-02

## 2021-03-01 RX ORDER — INFLUENZA VIRUS VACCINE 15; 15; 15; 15 UG/.5ML; UG/.5ML; UG/.5ML; UG/.5ML
0.5 SUSPENSION INTRAMUSCULAR ONCE
Refills: 0 | Status: COMPLETED | OUTPATIENT
Start: 2021-03-01 | End: 2021-03-01

## 2021-03-01 RX ORDER — OXYTOCIN 10 UNIT/ML
333.33 VIAL (ML) INJECTION
Qty: 20 | Refills: 0 | Status: DISCONTINUED | OUTPATIENT
Start: 2021-03-01 | End: 2021-03-02

## 2021-03-01 RX ORDER — ONDANSETRON 8 MG/1
4 TABLET, FILM COATED ORAL EVERY 6 HOURS
Refills: 0 | Status: DISCONTINUED | OUTPATIENT
Start: 2021-03-01 | End: 2021-03-03

## 2021-03-01 RX ORDER — OXYCODONE HYDROCHLORIDE 5 MG/1
5 TABLET ORAL
Refills: 0 | Status: DISCONTINUED | OUTPATIENT
Start: 2021-03-01 | End: 2021-03-03

## 2021-03-01 RX ORDER — SODIUM CHLORIDE 9 MG/ML
1000 INJECTION, SOLUTION INTRAVENOUS
Refills: 0 | Status: DISCONTINUED | OUTPATIENT
Start: 2021-03-01 | End: 2021-03-02

## 2021-03-01 RX ORDER — CITRIC ACID/SODIUM CITRATE 300-500 MG
30 SOLUTION, ORAL ORAL ONCE
Refills: 0 | Status: COMPLETED | OUTPATIENT
Start: 2021-03-01 | End: 2021-03-01

## 2021-03-01 RX ORDER — SODIUM CHLORIDE 9 MG/ML
1000 INJECTION, SOLUTION INTRAVENOUS ONCE
Refills: 0 | Status: COMPLETED | OUTPATIENT
Start: 2021-03-01 | End: 2021-03-01

## 2021-03-01 RX ORDER — MAGNESIUM HYDROXIDE 400 MG/1
30 TABLET, CHEWABLE ORAL
Refills: 0 | Status: DISCONTINUED | OUTPATIENT
Start: 2021-03-01 | End: 2021-03-03

## 2021-03-01 RX ORDER — OXYCODONE HYDROCHLORIDE 5 MG/1
5 TABLET ORAL ONCE
Refills: 0 | Status: DISCONTINUED | OUTPATIENT
Start: 2021-03-01 | End: 2021-03-03

## 2021-03-01 RX ORDER — LANOLIN
1 OINTMENT (GRAM) TOPICAL EVERY 6 HOURS
Refills: 0 | Status: DISCONTINUED | OUTPATIENT
Start: 2021-03-01 | End: 2021-03-03

## 2021-03-01 RX ORDER — DIPHENHYDRAMINE HCL 50 MG
25 CAPSULE ORAL EVERY 6 HOURS
Refills: 0 | Status: DISCONTINUED | OUTPATIENT
Start: 2021-03-01 | End: 2021-03-03

## 2021-03-01 RX ORDER — METOCLOPRAMIDE HCL 10 MG
10 TABLET ORAL ONCE
Refills: 0 | Status: COMPLETED | OUTPATIENT
Start: 2021-03-01 | End: 2021-03-01

## 2021-03-01 RX ORDER — IBUPROFEN 200 MG
1 TABLET ORAL
Qty: 0 | Refills: 0 | DISCHARGE
Start: 2021-03-01

## 2021-03-01 RX ORDER — HEPARIN SODIUM 5000 [USP'U]/ML
5000 INJECTION INTRAVENOUS; SUBCUTANEOUS EVERY 12 HOURS
Refills: 0 | Status: DISCONTINUED | OUTPATIENT
Start: 2021-03-01 | End: 2021-03-03

## 2021-03-01 RX ORDER — IBUPROFEN 200 MG
600 TABLET ORAL EVERY 6 HOURS
Refills: 0 | Status: COMPLETED | OUTPATIENT
Start: 2021-03-01 | End: 2022-01-28

## 2021-03-01 RX ORDER — OXYTOCIN 10 UNIT/ML
333.33 VIAL (ML) INJECTION
Qty: 20 | Refills: 0 | Status: DISCONTINUED | OUTPATIENT
Start: 2021-03-01 | End: 2021-03-01

## 2021-03-01 RX ADMIN — HEPARIN SODIUM 5000 UNIT(S): 5000 INJECTION INTRAVENOUS; SUBCUTANEOUS at 21:00

## 2021-03-01 RX ADMIN — FAMOTIDINE 20 MILLIGRAM(S): 10 INJECTION INTRAVENOUS at 13:48

## 2021-03-01 RX ADMIN — Medication 10 MILLIGRAM(S): at 13:48

## 2021-03-01 RX ADMIN — Medication 30 MILLIGRAM(S): at 21:00

## 2021-03-01 RX ADMIN — ONDANSETRON 4 MILLIGRAM(S): 8 TABLET, FILM COATED ORAL at 23:35

## 2021-03-01 RX ADMIN — Medication 1000 MILLIUNIT(S)/MIN: at 16:19

## 2021-03-01 RX ADMIN — SODIUM CHLORIDE 200 MILLILITER(S): 9 INJECTION, SOLUTION INTRAVENOUS at 13:50

## 2021-03-01 RX ADMIN — SODIUM CHLORIDE 2000 MILLILITER(S): 9 INJECTION, SOLUTION INTRAVENOUS at 11:00

## 2021-03-01 RX ADMIN — Medication 30 MILLILITER(S): at 12:47

## 2021-03-01 RX ADMIN — SODIUM CHLORIDE 75 MILLILITER(S): 9 INJECTION, SOLUTION INTRAVENOUS at 16:19

## 2021-03-01 RX ADMIN — Medication 400 MILLIGRAM(S): at 16:20

## 2021-03-01 NOTE — OB PROVIDER H&P - ASSESSMENT
GEN NAD  Lungs CTAB  Heart RRR  T(C): 36.6 (01 Mar 2021 10:50), Max: 36.6 (01 Mar 2021 10:50)  T(F): 97.9 (01 Mar 2021 10:50), Max: 97.9 (01 Mar 2021 10:50)  HR: 116 (01 Mar 2021 10:50) (116 - 116)  BP: 123/80 (01 Mar 2021 10:50) (123/80 - 123/80)  RR: 14 (01 Mar 2021 10:50) (14 - 14)  /mod tristen/+ accels/no decels  Bradshaw irregular  Sono fetal head in maternal RUQ transverse spine down  EFW~7#5 by leopolds    A/P: 22 y/o  EDC 3/4/21 @39+4w scheduled  for transverse/breech.    -Admit to L&D  -Routine labs  -IVF/NPO  -Anesthesia consult    KEKE dennis

## 2021-03-01 NOTE — DISCHARGE NOTE OB - MEDICATION SUMMARY - MEDICATIONS TO STOP TAKING
I will STOP taking the medications listed below when I get home from the hospital:  None I will STOP taking the medications listed below when I get home from the hospital:    cephalexin 500 mg oral tablet  -- 1 tab(s) by mouth 2 times a day   -- Finish all this medication unless otherwise directed by prescriber.

## 2021-03-01 NOTE — DISCHARGE NOTE OB - HOSPITAL COURSE
Patient presented on 3/ for ECV vs primary  for breech presentation. ECV unsuccessful, patient had pLTCS with  on 3/. Post partum course uncomplicated. Discharged on POD#2 in stable condition. Patient presented on 3/ for ECV vs primary  for breech presentation. ECV unsuccessful, patient had uncomplicated pLTCS with  on 3. Post partum course remained uncomplicated. Discharged on POD#2 in stable condition w/pain well controlled, VS wnl, tolerating regular diet, ambulating and voiding spontaneously.

## 2021-03-01 NOTE — OB PROVIDER DELIVERY SUMMARY - NSPROVIDERDELIVERYNOTE_OBGYN_ALL_OB_FT
Patient admitted for pLTCS for breech malpresentation.  Attempt at ECV in OR failed due to patient discomfort and preference to proceed with surgery.  Live male  delivery via lower uterine segment incision. Apgars 9/9. Weight= 3170g, kallie breech presentation. Nuchal x1. Cord clamped and cut. Cord gas sent. Placenta delivered intact. Hysterotomy closed with caprosyn in 1 layer.  Fascia closed with 0-vicryl. Skin closed with monocryl. QBL 481cc, IVF 1000 mL, UOP 275cc.

## 2021-03-01 NOTE — DISCHARGE NOTE OB - PATIENT PORTAL LINK FT
You can access the FollowMyHealth Patient Portal offered by [facility] by registering at the following website: [email or web address]. By joining [employer]’s FollowMyHealth portal, you will also be able to view your health information using other applications (apps) compatible with our system.

## 2021-03-01 NOTE — DISCHARGE NOTE OB - CARE PROVIDER_API CALL
LAURIE ACU,   Please follow-up in 2 weeks for an incision check and for a postpartum appointment in 4-6 weeks at Ambulatory Clinic Unit, LAURIE, [facility], [facility]. Please call the office for an appointment phone # [phone]  Phone: (   )    -  Fax: (   )    -  Follow Up Time:    LAURIE ACU,   Please follow-up in 2 weeks for an incision check and for a postpartum appointment in 4-6 weeks at Ambulatory Clinic Unit, LAURIE, [facility], [facility]. Please call the office for an appointment phone # [phone]  Phone: (   )    -  Fax: (   )    -  Follow Up Time: 2 weeks

## 2021-03-01 NOTE — OB PROVIDER H&P - ATTENDING COMMENTS
OB Service Attending    Spoke to pt via  and informed consent obtained for  section.  Plan for attempt by MFM for ECV under epidural anesthesia.  If successful will induce labor.    MD Dylan

## 2021-03-01 NOTE — OB RN INTRAOPERATIVE NOTE - NS_INDWELLINGURINARYCATHETERINSERTEDBY_OBGYN_ALL_OB_FT
UROLOGY consult Note      2/22/2017    UROLOGY CHIEF COMPLAINT  This patient is being seen at the request of  ref. provider found for an evaluation and/or opinion regarding renal failure and bilateral hydronephrosis    UROLOGY HISTORY OF PRESENT ILLNESS    Chito Pruitt is a 77 year old male who presents in follow-up for bilateral hydronephrosis with a rising creatinine. Patient presented to the emergency room in [address] [place] [address] with increasing left flank pain, and was found to have a creatinine that is now up to 7.2 from 3.77 roughly 2 weeks ago.imaging study week ago showed bilateral hydronephrosis with right sided dilation down to the UVJ area and left sided dilation into the pelvis. There was no clear extrinsic compression of the ureters or kirill disease. The patient has known bladder cancer and was scheduled for a transurethral resection of his bladder tumors roughly a week and a half from now. Because of his worsening renal function and flank pain, he is being seen today and evaluated and likely will undergo cystoscopy, bilateral retrograde pyelograms, stent placements, and resection of his bladder cancer in the next day or so. His past history is detailed as below. The patient has had a prior abdominal aortic aneurysm repair remotely. He also has hypertension and peripheral vascular disease.    PAST MEDICAL HISTORY    Stool culture positive for Clostridium diffici* 3/26/2013       Comment: 03/22/13 OP+    Diabetes mellitus                                             HTN (hypertension)                                            Hyperlipidemia                                                PVD (peripheral vascular disease)                             CKD (chronic kidney disease)                                  Hypercalcemia                                                 AAA (abdominal aortic aneurysm)                 About 4 y*    Chronic anemia                                                Sigmoid  diverticulosis                                        Internal hemorrhoids                                          Secondary hyperparathyroidism                                   Comment: mild    Stenosis, spinal, lumbar                                      Colon polyps                                    9/2009        Lumbar stenosis                                 8/28/2014     Claudication                                    8/28/2014     Lumbar spondylosis                              8/28/2014     Lumbar radiculitis                              8/28/2014     Glaucoma, open angle                                          Epiretinal membrane, left eye                                 Age-related nuclear cataract of both eyes                     Posterior vitreous detachment of left eye                     Urinary tract infection                         After 02/*      Comment: 2 weeks ago (after Valentines Day, exact date                unknown per patient     Malignant neoplasm                                              Comment: Bladder     Chronic pain                                                    Comment: Back     PAST SURGICAL HISTORY      COLONOSCOPY REMOVE LESIONS BY SNARE             09/22/2009      Comment: due 9/2012    INGUINAL HERNIA REPAIR                          09/14/2004    FLEXIBLE SIGMOIDOSCOPY DIAGNOSTIC INCLUDE SPEC* 06/07/2001    LEFT HEART CATH,PERCUTANEOUS                    01/12/1993      Comment: 80% RCA    BYPASS GRAFT OTHR,AORTO-ILIAC                   07/08/1981    COLONOSCOPY W/ POLYPECTOMY                      01/01/1987      Comment: adenoma    ABDOMINAL AORTIC ANEURYSM REPAIR, ENDOVASCULAR  2012            Comment: Ayala    COLONOSCOPY                                     10/04/2013      Comment: Repeat colonoscopy in 5 years if general health               is good.    LASER SURGERY OF EYE                            02/24/2016      Comment: SLT Laser, Trabeculoplasty     TRANSURETHRAL RESECTION OF PROSTATE             04/15/2016    CATARACT EXTRACTION W/ INTRAOCULAR LENS IMPLANT 06/03/2016      Comment: Dr. Camargo    CATARACT EXTRACTION W/ INTRAOCULAR LENS IMPLANT 6/17/16         Comment: Dr. Camargo    LASER SURGERY OF EYE                            04/12/2010      Comment: Ramo DEGROOT    LASER SURGERY OF EYE                            04/20/2010      Comment: Ramo DEGROOT    CARDIAC CATHETERIZATION                                         Comment: Treatment for AAA    EYE SURGERY                                                   MEDICATIONS    Current Facility-Administered Medications   Medication   • aspirin (ECOTRIN) enteric coated tablet 81 mg   • atorvastatin (LIPITOR) tablet 80 mg   • brimonidine (ALPHAGAN) 0.2 % ophthalmic solution 1 drop   • dorzolamide-timolol (COSOPT) 22.3-6.8 MG/ML ophthalmic solution 1 drop   • HYDROcodone-acetaminophen (NORCO) 5-325 MG per tablet 1 tablet   • latanoprost (XALATAN) 0.005 % ophthalmic solution 1 drop   • levothyroxine (SYNTHROID, LEVOTHROID) tablet 125 mcg   • metoPROLOL (LOPRESSOR) tablet 50 mg   • sodium chloride (PF) 0.9 % injection 2 mL   • sodium chloride (PF) 0.9 % injection 2 mL   • heparin (porcine) injection 5,000 Units   • sodium chloride 0.9% infusion   • ondansetron (ZOFRAN) injection 4 mg   • acetaminophen (TYLENOL) tablet 650 mg   • morphine injection 2 mg   • dextrose 50 % injection 25 g   • dextrose 5 % infusion   • glucagon (GLUCAGEN) injection 1 mg   • dextrose (GLUTOSE) 40 % gel 15 g   • sodium chloride (PF) 0.9 % injection 2 mL   • sodium chloride (PF) 0.9 % injection 2 mL   • insulin lispro (HumaLOG) correction dose   • hydrALAZINE (APRESOLINE) injection 10 mg   • allopurinol (ZYLOPRIM) tablet 100 mg   • predniSONE (DELTASONE) tablet 20 mg       ALLERGIES    ALLERGIES:   Allergen Reactions   • Lisinopril      Hyperkalemia and increased creatinine.       Review of Systems  I have personally reviewed and updated the following  EPIC sections: Current medications, Allergies, Problem list, Past Medical History and Past Surgical History  A complete 14 point review of systems was performed with pertinent positives mentioned per history of present illness and associated with findings in the past medical history.  PHYSICAL EXAMINATION    Vitals signs:  Blood pressure 162/74, pulse 75, temperature 97.9 °F (36.6 °C), temperature source Oral, resp. rate 18, height 5' 6\" (1.676 m), weight 75.9 kg, SpO2 95 %.  PHYSICAL EXAM:  Constitutional:  No acute distress, Non-toxic appearance.  HENT: Normocephalic, Atraumatic, Bilateral external ears normal, Oropharynx moist, No oral exudates, Nose normal.  Eyes:  PERRLA, EOMI, Conjunctiva normal, No discharge.  Neck: Normal range of motion, No tenderness, Supple, No lymphadenopathy, No stridor.  Cardiovascular:  Normal heart rate, Normal rhythm, No murmurs, No rubs, No gallops.  Pulmonary/Chest:  Normal breath sounds, No respiratory distress, No wheezing, No chest tenderness.  Abdomen:  Bowel sounds normal, Soft, No tenderness, No masses, No pulsatile masses.  Back:  No tenderness, No CVA tenderness.  Genital:  Normal testicular exam without masses.patient complains of mild tenderness along the left testicle and epididymis area. There is a questionable hernia on the left side by palpation, but no right sided hernia..  Normal penis without lesions. Prostate not done  Lymphatic:  No lymphadenopathy noted.  Skin:  Warm, Dry, No erythema, No rash.  Psychiatric:  Affect normal, Judgement normal, Mood normal.  Extremities:  Intact distal pulses, No edema, No tenderness, No cyanosis, No clubbing.   Neurologic:  Alert & oriented x 3, Normal motor function, Normal sensory function, No focal deficits noted. Normal reflexes.  Normal Cranial Nerves.      RESULTS  Creatinine (mg/dL)   Date Value   02/23/2017 7.21 (H)     GFR Estimate, Non  (no units)   Date Value   02/23/2017 7     GFR Estimate,   American (no units)   Date Value   02/23/2017 8       Lab Results   Component Value Date    PSA 4.65 (H) 01/31/2017       Imaging and other studies:   No results found for this or any previous visit.  No results found for this or any previous visit.  Results for orders placed during the hospital encounter of 02/14/17   CT ABDOMEN PELVIS FOR KIDNEY STONES    Narrative EXAM: CT ABDOMEN PELVIS FOR KIDNEY STONES WO CONTRAST    HISTORY: From order: FLANK PAIN, HEMATURIA , history of bladder cancer     CONTRAST: None.    COMPARISON: 2/10/2016 .    ABDOMEN FINDINGS:    Lung bases clear, no effusion. Heart mildly enlarged.     Liver, Spleen and Pancreas: No acute changes; no masses.     Gallbladder: Absent .    Kidneys and Adrenal Glands: The left renal pelvis has scattered  calcifications which appear vascular. Neither kidney shows a clotting  system stone. Both kidneys have dilated renal pelves and proximal ureters,  this is more extensive right than left. However no stone or obstruction is  seen. The bladder is probably distended and contains some gas bubbles,  correlate with any recent catheterization. The right ureter is dilated to  just above the UVJ, the left ureter is dilated to the mid pelvis as well.     Aorta and Retroperitoneum: No aneurysm or adenopathy.    Bowel: Normal, without dilation or wall thickening. No free air or fluid.  Fairly extensive sigmoid diverticulosis with no inflammation.    Appendix: Normal.    Scarring in hernia is stable at umbilicus.     PELVIS FINDINGS:    Widespread sigmoid diverticulosis    Partial distention of bladder, containing gas bubbles, correlate with  recent catheterization. Bilateral hydroureter without definite stone.     No free fluid or pelvic sidewall mass.    No inguinal mass or hernia.    Some degenerative spurring noted in the spine. No compression fracture.  There is a new lytic area extending from the posterior lateral left  vertebral body at L5 into the pedicle.  This could represent a destructive  lesion. Not previously evident.    There is a stable right axillary bilateral femoral subcutaneous bypass  graft.       Impression IMPRESSION:  There is bilateral hydronephrosis and hydroureter without  radiopaque obstructing stones. The bladder contains some air bubbles.  Please correlate with clinical catheterization history. No obstructing  stones are noted.    There is sigmoid diverticulosis with no inflammation    No acute abnormality liver spleen or pancreas.    The appendix is seen to be normal.        Results for orders placed during the hospital encounter of 02/10/16   CT Abdomen Pelvis    Narrative Exam: CT of the abdomen and pelvis without contrast    Date of study: 2/10/2016    CLINICAL INDICATION: Bladder cancer    COMPARISON: CTA of the abdomen and pelvis with contrast 11/1/2012    Contrast: Oral contrast was administered prior to this exam. No intravenous  contrast was administered prior to scanning.    TECHNIQUE: Following the administration of oral contrast only, thin slice  axial imaging through the abdomen and pelvis was performed. Images were  then reformatted into axial, coronal, and sagittal planes and submitted for  evaluation.    FINDINGS: The visualized lung bases are clear without focal areas of  consolidation or pleural fluid.    CT ABDOMEN: Evaluation of the solid organs is degraded secondary to lack of  intravenous contrast administration. No discrete mass or intrahepatic  biliary ductal dilatation is evident. The gallbladder appears relatively  contracted. The spleen is not enlarged. No discrete mass involves the  pancreas. The adrenal glands are not enlarged.    Advanced atherosclerotic disease involves the infrarenal abdominal aorta  with complete occlusion distally as well as advanced atherosclerotic  disease involving the common iliac arteries. Interval surgical intervention  of the left common iliac artery aneurysm has been performed. Bypass  graft  is noted within the right anterolateral lower thoracic and abdominal soft  tissues with connection to the common femoral arteries bilaterally. Patency  cannot be confirmed given lack of contrast administration.    No pathologic rectoperineal adenopathy is evident. The kidneys are  lobulated in contour. A low-attenuation focus involves the upper pole of  the right kidney measuring approximately 1.2 cm with Hounsfield units  averaging 8 which may relate to a cyst. Diminished attenuation is noted  medially involving the midpole of the left kidney measuring 1.5 cm with  Hounsfield units averaging 13 which also may relate to a cyst. Exophytic  cyst arising off the inferior pole of the left kidney is present  posteriorly measuring 0.9 cm. Probable exophytic cyst arises off the  inferior pole of the right kidney measuring 0.9 cm with Hounsfield units  averaging 8. No discrete mass, hydronephrosis, or obstructive renal calculi  are demonstrated bilaterally. Vascular calcifications are present involving  the left kidney.    Evaluation of the bowel demonstrates no evidence of obstruction,  pneumatosis, free air, or ascites within the abdomen. The appendix is  opacified without CT evidence of acute appendicitis. Diverticulosis  involves the descending and rectosigmoid colon without convincing CT  evidence of acute diverticulitis.    CT PELVIS: Soft tissue mass densities are noted posteriorly within the  bladder right and left of midline. That on the right measures approximately  1.9 cm in greatest dimension than on the left 4.1 cm. These may coalesce at  midline with nodular soft tissue density extending along the posterior  bladder wall. Subtle increased attenuation is questioned involving the  superior anterolateral aspect of the bladder measuring 2.4 cm x 2.8 cm.  Additional mass in this location not excluded. The prostate gland is  prominent in size. No discrete pathologic adenopathy involves the pelvis.  Prostatic  calcifications are present.    Evaluation of the osseous structures of the abdomen and pelvis illustrates  relatively advanced degenerative change of the lumbar spine.      Impression IMPRESSION:  1. Findings compatible with patient's stated history of bladder mass  posteriorly. This appears lobulated and does cross midline. Mass involving  the bladder superiorly and anteriorly right of midline not excluded  measuring 2.8 cm x 2.4 cm. Please correlate clinically.  2. Probable bilateral renal cysts. Ultrasound could be utilized to confirm  simple cystic nature.  3. Other findings as above.       No results found for this or any previous visit.  ASSESSMENT/PLAN  Bilateral hydronephrosis with known bladder cancer and worsening creatinine.  Plan cystoscopy, transurethral resection of bladder tumors, bilateral retrograde pyelograms, and bilateral stent placement in the next day if possible. The risks benefits and alternatives have been discussed with the patient at length, he is agreeable to the procedure.    Sachin May MD   ALEXANDRE Ulrich MD

## 2021-03-01 NOTE — OB PROVIDER H&P - HISTORY OF PRESENT ILLNESS
20 y/o  EDC 3/4/21 @39+4w scheduled  for malpresentation. Denies abd pain, LOF, VB. Endorses +FM. GBS negative. COVID negative. Kim Hirschberger  at bedside.    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

## 2021-03-01 NOTE — DISCHARGE NOTE OB - MEDICATION SUMMARY - MEDICATIONS TO TAKE
I will START or STAY ON the medications listed below when I get home from the hospital:    acetaminophen 325 mg oral tablet  -- 3 tab(s) by mouth   -- Indication: For pain control    ibuprofen 600 mg oral tablet  -- 1 tab(s) by mouth every 6 hours  -- Indication: For pain control   I will START or STAY ON the medications listed below when I get home from the hospital:    Breast Pump  -- 1 unit(s) parenteral prn   -- Indication: For Breastfeeding    oxyCODONE 5 mg oral tablet  -- 1 tab(s) by mouth every 8 hours MDD:3 pills  -- Caution federal law prohibits the transfer of this drug to any person other  than the person for whom it was prescribed.  It is very important that you take or use this exactly as directed.  Do not skip doses or discontinue unless directed by your doctor.  May cause drowsiness or dizziness.  This prescription cannot be refilled.  Using more of this medication than prescribed may cause serious breathing problems.    -- Indication: For Severe pain    acetaminophen 325 mg oral tablet  -- 3 tab(s) by mouth   -- Indication: For pain control    ibuprofen 600 mg oral tablet  -- 1 tab(s) by mouth every 6 hours  -- Indication: For pain control

## 2021-03-01 NOTE — DISCHARGE NOTE OB - PROVIDER TOKENS
[facility]PHONE:[(   )    -],FAX:[(   )    -],ADDRESS:[Please follow-up in 2 weeks for an incision check and for a postpartum appointment in 4-6 weeks at Ambulatory Clinic Unit, [facility], Oncology Building, [facility]. Please call the office for an appointment phone # [phone]]] FREE:[LAST:[[facility]],PHONE:[(   )    -],FAX:[(   )    -],ADDRESS:[Please follow-up in 2 weeks for an incision check and for a postpartum appointment in 4-6 weeks at Ambulatory Clinic Unit, [facility], Oncology Building, [facility]. Please call the office for an appointment phone # [phone]],FOLLOWUP:[2 weeks]]

## 2021-03-01 NOTE — OB NEONATOLOGY/PEDIATRICIAN DELIVERY SUMMARY - NSPEDSNEONOTESA_OBGYN_ALL_OB_FT
Baby is a 39.4 wk male born to a 23 y/o G_P_ mother via C/S for breech presentation, s/p failed external cephalic version. Maternal history significant for congenital deafness, intermediate Tb quantiferon in Dec 2020 (CXR negative), and chlamydia infection s/p abx treatment. Pregnancy complicated by kallie breech presentation. Maternal blood type O+. Prenatal labs negative, non-reactive and immune. GBS negative on 2/17. AROM at time of delivery, clear fluids. Baby was born breech, vigorous and crying spontaneously. W/D/S/S. APGARS 9/9. EOS: n/a. Parents are COVID negative.    Physical exam remarkable for small scrotum with undescended testes bilaterally.

## 2021-03-01 NOTE — DISCHARGE NOTE OB - PLAN OF CARE
Make your follow-up appointment with your doctor as ordered. Call the office to schedule a 2 week follow-up for an incision check and postpartum follow-up in 6 weeks. No heavy lifting, driving, or strenuous activity for 6 weeks. Nothing per vagina such as tampons, intercourse, douches or tub baths for 6 weeks or until you see your doctor. Call your doctor with any signs and symptoms of infection such as fever, chills, nausea or vomiting. Call your doctor with redness or swelling at the incision site, fluid leakage or wound separation. Call your doctor if you’re unable to tolerate food, increase in vaginal bleeding, or have difficulty urinating. Call your doctor if you have pain that is not relieved by your prescribed medications. Notify your doctor with any other concerns. Call [phone] if you have any of these concerns in the next 6 weeks. Return to baseline Make a follow-up appointment with your doctor in TWO WEEKS for an incision check and in SIX WEEKS for a postpartum appointment. No heavy lifting or strenuous activity for six weeks. Nothing in the vagina (such as tampons, douches, intercourse or tub baths) until you see your doctor. You can take 1000mg of Tylenol and/or 600mg of Motrin every 6 hours for pain. You may take Oxycodone for breakthrough pain no more than once every 8 hours. Call your doctor with any signs and symptoms of infection such as fever, chills, nausea or vomiting; if you're unable to tolerate food, have an increase in vaginal bleeding, or have difficulty urinating; or if you have pain that is not relieved by medication. Notify your doctor with any other concerns including feeling depressed or "down". Recovery

## 2021-03-01 NOTE — CHART NOTE - NSCHARTNOTEFT_GEN_A_CORE
[facility] was called to attempt an external cephalic version for this nullip at 39+4 weeks with persistent breech presentation.  After confirmation of kallie breech presentation with OBINNA 10cm, she was consented with the assistance of a . Discussion of R/B/A held, including possible failure, possible fetal distress, or abruption, and necessity of  delivery if the version failed. Pt agreed to proceed.  After placing epidural anesthesia, a front flip external cephalic version was attempted under direct sonographic guidance  The patient did not tolerate the pressure of the attempted version and requested we abort the procedure  Normal fetal heart rate was confirmed after we stopped the procedure  She will proceed with primary  delivery at this time    MD BRIAN Clement Fellow    Seen and procedure performed with Millie Henriquez PGY4, and BRIAN Duarte attg [facility] was called to attempt an external cephalic version for this nullip at 39+4 weeks with persistent breech presentation.  After confirmation of kallie breech presentation with OBINNA 10cm, she was consented with the assistance of a . Discussion of R/B/A held, including possible failure, possible fetal distress, or abruption, and necessity of  delivery if the version failed. Pt agreed to proceed.  After placing epidural anesthesia, a front flip external cephalic version was attempted under direct sonographic guidance  The patient did not tolerate the pressure of the attempted version and requested we abort the procedure  Normal fetal heart rate was confirmed after we stopped the procedure  She will proceed with primary  delivery at this time    MD Racquel  [facility] Fellow    Seen and procedure performed with Millie Henriquez PGY4, and BRIAN Duarte attg    [facility] Attending  Patient chart reviewed and counseled by me regarding kallie breech  presentation and ECV. I reviewed the risk, benefits and alternatives. Using ultrasound guidance after placement of CSE an ECV was attempted. I agree with the above documentation of the procedure. It was aborted due to maternal pain.  Dr. Duarte

## 2021-03-01 NOTE — DISCHARGE NOTE OB - CARE PLAN
Principal Discharge DX:	 delivery delivered  Goal:	Return to baseline  Assessment and plan of treatment:	Make your follow-up appointment with your doctor as ordered. Call the office to schedule a 2 week follow-up for an incision check and postpartum follow-up in 6 weeks. No heavy lifting, driving, or strenuous activity for 6 weeks. Nothing per vagina such as tampons, intercourse, douches or tub baths for 6 weeks or until you see your doctor. Call your doctor with any signs and symptoms of infection such as fever, chills, nausea or vomiting. Call your doctor with redness or swelling at the incision site, fluid leakage or wound separation. Call your doctor if you’re unable to tolerate food, increase in vaginal bleeding, or have difficulty urinating. Call your doctor if you have pain that is not relieved by your prescribed medications. Notify your doctor with any other concerns. Call [phone] if you have any of these concerns in the next 6 weeks.   Principal Discharge DX:	 delivery delivered  Goal:	Recovery  Assessment and plan of treatment:	Make a follow-up appointment with your doctor in TWO WEEKS for an incision check and in SIX WEEKS for a postpartum appointment. No heavy lifting or strenuous activity for six weeks. Nothing in the vagina (such as tampons, douches, intercourse or tub baths) until you see your doctor. You can take 1000mg of Tylenol and/or 600mg of Motrin every 6 hours for pain. You may take Oxycodone for breakthrough pain no more than once every 8 hours. Call your doctor with any signs and symptoms of infection such as fever, chills, nausea or vomiting; if you're unable to tolerate food, have an increase in vaginal bleeding, or have difficulty urinating; or if you have pain that is not relieved by medication. Notify your doctor with any other concerns including feeling depressed or "down".

## 2021-03-02 LAB
BASOPHILS # BLD AUTO: 0.02 K/UL — SIGNIFICANT CHANGE UP (ref 0–0.2)
BASOPHILS NFR BLD AUTO: 0.1 % — SIGNIFICANT CHANGE UP (ref 0–2)
EOSINOPHIL # BLD AUTO: 0 K/UL — SIGNIFICANT CHANGE UP (ref 0–0.5)
EOSINOPHIL NFR BLD AUTO: 0 % — SIGNIFICANT CHANGE UP (ref 0–6)
HCT VFR BLD CALC: 32.3 % — LOW (ref 34.5–45)
HGB BLD-MCNC: 10.5 G/DL — LOW (ref 11.5–15.5)
IANC: 11.51 K/UL — HIGH (ref 1.5–8.5)
IMM GRANULOCYTES NFR BLD AUTO: 0.4 % — SIGNIFICANT CHANGE UP (ref 0–1.5)
LYMPHOCYTES # BLD AUTO: 17.2 % — SIGNIFICANT CHANGE UP (ref 13–44)
LYMPHOCYTES # BLD AUTO: 2.67 K/UL — SIGNIFICANT CHANGE UP (ref 1–3.3)
MCHC RBC-ENTMCNC: 30.1 PG — SIGNIFICANT CHANGE UP (ref 27–34)
MCHC RBC-ENTMCNC: 32.5 GM/DL — SIGNIFICANT CHANGE UP (ref 32–36)
MCV RBC AUTO: 92.6 FL — SIGNIFICANT CHANGE UP (ref 80–100)
MONOCYTES # BLD AUTO: 1.26 K/UL — HIGH (ref 0–0.9)
MONOCYTES NFR BLD AUTO: 8.1 % — SIGNIFICANT CHANGE UP (ref 2–14)
NEUTROPHILS # BLD AUTO: 11.51 K/UL — HIGH (ref 1.8–7.4)
NEUTROPHILS NFR BLD AUTO: 74.2 % — SIGNIFICANT CHANGE UP (ref 43–77)
NRBC # BLD: 0 /100 WBCS — SIGNIFICANT CHANGE UP
NRBC # FLD: 0 K/UL — SIGNIFICANT CHANGE UP
PLATELET # BLD AUTO: 251 K/UL — SIGNIFICANT CHANGE UP (ref 150–400)
RBC # BLD: 3.49 M/UL — LOW (ref 3.8–5.2)
RBC # FLD: 13.5 % — SIGNIFICANT CHANGE UP (ref 10.3–14.5)
WBC # BLD: 15.52 K/UL — HIGH (ref 3.8–10.5)
WBC # FLD AUTO: 15.52 K/UL — HIGH (ref 3.8–10.5)

## 2021-03-02 PROCEDURE — 59412 ANTEPARTUM MANIPULATION: CPT

## 2021-03-02 RX ORDER — MEDROXYPROGESTERONE ACETATE 150 MG/ML
150 INJECTION, SUSPENSION, EXTENDED RELEASE INTRAMUSCULAR ONCE
Refills: 0 | Status: DISCONTINUED | OUTPATIENT
Start: 2021-03-02 | End: 2021-03-03

## 2021-03-02 RX ORDER — IBUPROFEN 200 MG
600 TABLET ORAL EVERY 6 HOURS
Refills: 0 | Status: DISCONTINUED | OUTPATIENT
Start: 2021-03-02 | End: 2021-03-03

## 2021-03-02 RX ADMIN — Medication 600 MILLIGRAM(S): at 18:30

## 2021-03-02 RX ADMIN — Medication 30 MILLIGRAM(S): at 05:18

## 2021-03-02 RX ADMIN — HEPARIN SODIUM 5000 UNIT(S): 5000 INJECTION INTRAVENOUS; SUBCUTANEOUS at 12:22

## 2021-03-02 RX ADMIN — Medication 30 MILLIGRAM(S): at 12:25

## 2021-03-02 NOTE — PROGRESS NOTE ADULT - ATTENDING COMMENTS
Associate Chief of L & D ( late entry)    OB Progress Note:  Delivery, POD#1  I met this patient for the first time yesterday morning and her partner was kind enough to translate for me since her  was not present as of yet.  She was admitted by the PSO and had a failed the version and proceeded with a primary c section. and she had urinary retention which resolved    Yulietciaran Butt M.D., M.B.A., M.S.

## 2021-03-02 NOTE — LACTATION INITIAL EVALUATION - INTERVENTION OUTCOME
nbn has  short  bursts  of  sucking  and  swallowing  .  mother needed help with positioning  . reviewed  with  mother  and  father  how  to get  a deep latch .  tongue  not  passing  lip  line  .  mother  denies  painful  latch .  encouraged to ask  for assistance  .  reviewed  risks  of  formula feeding .  Kim Hirschberger   was  intrepreter  during  this  visit  and  reviewed  lactation interventions and  hand  expression,  safe positioning, follow  up  with peds  if  latch is  painful .  rn aware  of  visit  and  to reinforce  education./verbalizes understanding/needs not met

## 2021-03-02 NOTE — LACTATION INITIAL EVALUATION - NS LACT CON REASON FOR REQ
used  brandie Perkins  to explain  breastfeeding  section  of  booklet and  reviewed log.  intrepreter Kim hirschberger  assisted  during  assessment of latch and  positioning./primaparous mom

## 2021-03-02 NOTE — LACTATION INITIAL EVALUATION - LACTATION INTERVENTIONS
reviewed with  mother breastfeeding section  of  postpartum  book./initiate skin to skin/initiate hand expression routine/initiate/review early breastfeeding management guidelines/initiate/review techniques for position and latch/initiate/review breast massage/compression

## 2021-03-03 ENCOUNTER — NON-APPOINTMENT (OUTPATIENT)
Age: 21
End: 2021-03-03

## 2021-03-03 VITALS — HEART RATE: 87 BPM

## 2021-03-03 RX ORDER — OXYCODONE HYDROCHLORIDE 5 MG/1
1 TABLET ORAL
Qty: 10 | Refills: 0
Start: 2021-03-03

## 2021-03-03 RX ADMIN — Medication 600 MILLIGRAM(S): at 00:02

## 2021-03-03 RX ADMIN — HEPARIN SODIUM 5000 UNIT(S): 5000 INJECTION INTRAVENOUS; SUBCUTANEOUS at 00:01

## 2021-03-03 RX ADMIN — HEPARIN SODIUM 5000 UNIT(S): 5000 INJECTION INTRAVENOUS; SUBCUTANEOUS at 13:04

## 2021-03-03 NOTE — PROGRESS NOTE ADULT - ATTENDING COMMENTS
Associate Chief of L & D ( late entry) VIA     OB Progress Note:  Delivery, POD#2    S: 22yo POD#2  s/p LTCS. denies any complaint at this time reported that she was ablre to void on her own  O:   Vital Signs Last 24 Hrs  T(C): 36.7 (03 Mar 2021 06:24), Max: 36.7 (03 Mar 2021 06:24)  T(F): 98.1 (03 Mar 2021 06:24), Max: 98.1 (03 Mar 2021 06:24)  HR: 80 (03 Mar 2021 06:24) (60 - 80)  BP: 125/84 (03 Mar 2021 06:24) (100/62 - 125/84)  RR: 18 (03 Mar 2021 06:24) (18 - 18)  SpO2: 98% (03 Mar 2021 06:24) (95% - 98%)    Labs:  Blood type: O Positive  Rubella IgG: RPR: Negative                          10.5<L>   15.52<H> >-----------< 251    (  @ 07:00 )             32.3<L>                        13.1   9.68 >-----------< 241    (  @ 10:46 )             39.7          PE:    Abdomen:  soft, fundus firm, Mildly distended, appropriately tender  incision c/d/i.  Extremities: No erythema, no pitting edema    A/P: 22yo POD#2 s/p LTCS due to failed version    -  Patient is stable for discharge and will follow up in the PP clinic    Yuliet Butt M.D., M.B.A., M.S.

## 2021-03-03 NOTE — PROGRESS NOTE ADULT - SUBJECTIVE AND OBJECTIVE BOX
R1 Progress Note    Patient is a 22yo Deaf  POD#2 s/p pLTCS 2/2 breech position after failed ECV, seen and examined at bedside. No acute overnight events. No acute complaints, pain well controlled. Patient is ambulating and tolerating regular diet, voiding spontaneously and passing flatus. Denies CP, SOB, N/V, HA, blurred vision, epigastric pain.    Vital Signs Last 24 Hours  T(C): 36.7 (21 @ 06:24), Max: 36.7 (21 @ 10:00)  HR: 80 (21 @ 06:24) (60 - 107)  BP: 125/84 (21 @ 06:24) (100/62 - 125/84)  RR: 18 (21 @ 06:24) (18 - 18)  SpO2: 98% (21 @ 06:24) (95% - 99%)    I&O's Summary    02 Mar 2021 07:01  -  03 Mar 2021 07:00  --------------------------------------------------------  IN: 0 mL / OUT: 900 mL / NET: -900 mL        Physical Exam:  General: NAD  Abdomen: Soft, appropriately tender, non-distended, fundus firm  Incision: Pfannenstiel incision CDI, subcuticular suture closure  Pelvic: Lochia wnl    Labs:    Blood Type: O Positive  Antibody Screen: Negative  RPR: Negative               10.5   15.52 )-----------( 251      (  @ 07:00 )             32.3                13.1   9.68  )-----------( 241      (  @ 10:46 )             39.7         MEDICATIONS  (STANDING):  acetaminophen   Tablet .. 975 milliGRAM(s) Oral <User Schedule>  diphtheria/tetanus/pertussis (acellular) Vaccine (ADAcel) 0.5 milliLiter(s) IntraMuscular once  heparin   Injectable 5000 Unit(s) SubCutaneous every 12 hours  ibuprofen  Tablet. 600 milliGRAM(s) Oral every 6 hours  medroxyPROGESTERone depot Injectable 150 milliGRAM(s) IntraMuscular once    MEDICATIONS  (PRN):  diphenhydrAMINE 25 milliGRAM(s) Oral every 6 hours PRN Pruritus  lanolin Ointment 1 Application(s) Topical every 6 hours PRN Sore Nipples  magnesium hydroxide Suspension 30 milliLiter(s) Oral two times a day PRN Constipation  ondansetron Injectable 4 milliGRAM(s) IV Push every 6 hours PRN Nausea and/or Vomiting  oxyCODONE    IR 5 milliGRAM(s) Oral every 3 hours PRN Moderate to Severe Pain (4-10)  oxyCODONE    IR 5 milliGRAM(s) Oral once PRN Moderate to Severe Pain (4-10)  simethicone 80 milliGRAM(s) Chew every 4 hours PRN Gas      
Written communication utilized w/patient    R1 Progress Note    Patient is a Deaf 22yo  POD#1 s/p pLTCS for breech position after failed ECV, seen and examined at bedside. Overnight patient had several episodes of vomiting which have since resolved and patient denies nausea at present. Her pain has been well controlled. Patient is ambulating, tolerating regular diet and passing flatus, but has not voided 8 hours after conroy removal. Denies CP, SOB, HA, blurred vision, epigastric pain.    Vital Signs Last 24 Hours  T(C): 36.6 (21 @ 06:19), Max: 37.3 (21 @ 22:13)  HR: 97 (21 @ 06:19) (61 - 116)  BP: 107/73 (21 @ 06:19) (107/70 - 143/69)  RR: 18 (21 @ 06:19) (13 - 20)  SpO2: 99% (21 @ 06:19) (97% - 100%)    I&O's Summary    01 Mar 2021 07:01  -  02 Mar 2021 07:00  --------------------------------------------------------  IN: 2900 mL / OUT: 1881 mL / NET: 1019 mL      Physical Exam:  General: NAD  Abdomen: Soft, appropriately tender, non-distended, fundus firm  Incision: Pfannenstiel incision CDI, subcuticular suture closure  Pelvic: Lochia wnl    Labs:    Blood Type: O Positive  Antibody Screen: Negative  RPR: Negative               10.5   15.52 )-----------( 251      (  @ 07:00 )             32.3                13.1   9.68  )-----------( 241      (  @ 10:46 )             39.7         MEDICATIONS  (STANDING):  acetaminophen   Tablet .. 975 milliGRAM(s) Oral <User Schedule>  diphtheria/tetanus/pertussis (acellular) Vaccine (ADAcel) 0.5 milliLiter(s) IntraMuscular once  heparin   Injectable 5000 Unit(s) SubCutaneous every 12 hours  ibuprofen  Tablet. 600 milliGRAM(s) Oral every 6 hours  ketorolac   Injectable 30 milliGRAM(s) IV Push every 6 hours    MEDICATIONS  (PRN):  diphenhydrAMINE 25 milliGRAM(s) Oral every 6 hours PRN Pruritus  lanolin Ointment 1 Application(s) Topical every 6 hours PRN Sore Nipples  magnesium hydroxide Suspension 30 milliLiter(s) Oral two times a day PRN Constipation  ondansetron Injectable 4 milliGRAM(s) IV Push every 6 hours PRN Nausea and/or Vomiting  oxyCODONE    IR 5 milliGRAM(s) Oral every 3 hours PRN Moderate to Severe Pain (4-10)  oxyCODONE    IR 5 milliGRAM(s) Oral once PRN Moderate to Severe Pain (4-10)  simethicone 80 milliGRAM(s) Chew every 4 hours PRN Gas

## 2021-03-03 NOTE — PROGRESS NOTE ADULT - ASSESSMENT
A: Patient is a Deaf 22yo  POD#2 s/p uncomplicated pLTCS, feeling well and meeting appropriate postoperative milestones.     Plan:   - continue current pain regimen  - continue regular diet  - increase ambulation   - discharge planning    DONNELL Sebastian, PGY1
Postop Day  1  s/p   C- Section    THERAPY:  [ X] Epidural morphine         Sedation Score:	  [ X] Alert	    [  ] Drowsy        [  ] Arousable	[  ] Asleep	[  ] Unresponsive    Side Effects:	  [ X] None	     [  ] Nausea        [  ] Pruritus        [  ] Weakness   [  ] Numbness        ASSESSMENT/ PLAN   Change to po prn pain meds 24 hours post Epidural Morphine.  [ x ]Documentation and Verification of current medications   
A: Patient is a Deaf 20yo  POD#1 s/p pLTCS 2/2 breech position w/overnight N/V that has resolved and has not yet voided since conroy removal. She is otherwise feeling well and meeting appropriate postoperative milestones.     Plan:   - Pt DTV@8AM. If not, bladder scan.   - continue current pain regimen  - continue regular diet  - increase ambulation     DONNELL Sebastian, PGY1

## 2021-03-05 DIAGNOSIS — O23.593 INFECTION OF OTHER PART OF GENITAL TRACT IN PREGNANCY, THIRD TRIMESTER: ICD-10-CM

## 2021-03-05 DIAGNOSIS — Z82.79 FAMILY HISTORY OF OTHER CONGENITAL MALFORMATIONS, DEFORMATIONS AND CHROMOSOMAL ABNORMALITIES: ICD-10-CM

## 2021-03-05 DIAGNOSIS — Z3A.38 38 WEEKS GESTATION OF PREGNANCY: ICD-10-CM

## 2021-03-05 PROBLEM — Z78.9 OTHER SPECIFIED HEALTH STATUS: Chronic | Status: INACTIVE | Noted: 2020-07-13 | Resolved: 2021-03-01

## 2021-03-05 LAB
SARS-COV-2 IGG SERPL IA-ACNC: 0.4 RATIO — SIGNIFICANT CHANGE UP
SARS-COV-2 IGG SERPL QL IA: NEGATIVE — SIGNIFICANT CHANGE UP
SARS-COV-2 IGG SERPL QL IA: NEGATIVE — SIGNIFICANT CHANGE UP
SARS-COV-2 IGM SERPL IA-ACNC: 0.23 RATIO — SIGNIFICANT CHANGE UP

## 2021-03-06 DIAGNOSIS — Z3A.39 39 WEEKS GESTATION OF PREGNANCY: ICD-10-CM

## 2021-03-06 DIAGNOSIS — Z82.79 FAMILY HISTORY OF OTHER CONGENITAL MALFORMATIONS, DEFORMATIONS AND CHROMOSOMAL ABNORMALITIES: ICD-10-CM

## 2021-03-06 DIAGNOSIS — O23.593 INFECTION OF OTHER PART OF GENITAL TRACT IN PREGNANCY, THIRD TRIMESTER: ICD-10-CM

## 2021-03-08 PROBLEM — H90.5 UNSPECIFIED SENSORINEURAL HEARING LOSS: Chronic | Status: ACTIVE | Noted: 2021-03-01

## 2021-03-19 ENCOUNTER — APPOINTMENT (OUTPATIENT)
Dept: OBGYN | Facility: HOSPITAL | Age: 21
End: 2021-03-19

## 2021-03-19 ENCOUNTER — OUTPATIENT (OUTPATIENT)
Dept: OUTPATIENT SERVICES | Facility: HOSPITAL | Age: 21
LOS: 1 days | End: 2021-03-19

## 2021-03-19 VITALS
HEART RATE: 98 BPM | BODY MASS INDEX: 30.78 KG/M2 | DIASTOLIC BLOOD PRESSURE: 88 MMHG | HEIGHT: 61 IN | SYSTOLIC BLOOD PRESSURE: 115 MMHG | TEMPERATURE: 97.5 F | WEIGHT: 163 LBS

## 2021-03-19 DIAGNOSIS — H90.5 UNSPECIFIED SENSORINEURAL HEARING LOSS: ICD-10-CM

## 2021-03-19 NOTE — HISTORY OF PRESENT ILLNESS
[Postpartum Follow Up] : postpartum follow up [Last Pap Date: ___] : Last Pap Date: [unfilled] [Delivery Date: ___] : on [unfilled] [Primary C/S] : delivered by  section [Male] : Delivery History: baby boy [Wt. ___] : weighing [unfilled] [Breastfeeding] : currently nursing [BF with Difficulty] : nursing with difficulty [Intended Contraception] : Intended Contraception: [Hormone Implants] : hormone implants [Episiotomy Site Pain] : episiotomy site pain [Vaginal Discharge] : vaginal discharge [Clean/Dry/Intact] : clean, dry and intact [Healed] : healed [Doing Well] : is doing well [No Sign of Infection] : is showing no signs of infection [Excellent Pain Control] : has excellent pain control [Abdominal Pain] : no abdominal pain [Back Pain] : no back pain [Breast Pain] : no breast pain [BreastFeeding Problems] : no breastfeeding problems [Chest Pain] : no chest pain [Cracked Nipples] : no cracked nipples [S/Sx PP Depression] : no signs/symptoms of postpartum depression [Heavy Bleeding] : no heavy bleeding [Incisional Drainage] : no incisional drainage [Incisional Pain] : no incisional pain [Irregular Bleeding] : no irregular bleeding [Leg Pain] : no leg pain [Shortness of Breath] : no shortness of breath [Suicidal Ideation] : no suicidal ideation [Erythema] : not erythematous [Swelling] : not swollen [Dehiscence] : not dehisced [None] : None [FreeTextEntry8] : post-op incision check [de-identified] : A+O x 3, NAD. Cardiac: R/R/R, +S1/S2; Pulmonary: CTAB, unlabored breathing. Pfannenstiel incision CDI, dermadond dressing [de-identified] : 24 y/o female now para 1001, s/p pLTCS on 3/1/21 for fetal presentation. Feeling well overall.  Pt desires Hormonal implant. [de-identified] : Follow-up for Postpartum visit in 4 weeks. Continue PNV, Pelvic rest until postpartum visit. Monitor for S+S of infection.

## 2021-03-22 DIAGNOSIS — Z98.891 HISTORY OF UTERINE SCAR FROM PREVIOUS SURGERY: ICD-10-CM

## 2021-03-22 DIAGNOSIS — H90.5 UNSPECIFIED SENSORINEURAL HEARING LOSS: ICD-10-CM

## 2021-03-22 DIAGNOSIS — Z48.89 ENCOUNTER FOR OTHER SPECIFIED SURGICAL AFTERCARE: ICD-10-CM

## 2021-04-16 ENCOUNTER — OUTPATIENT (OUTPATIENT)
Dept: OUTPATIENT SERVICES | Facility: HOSPITAL | Age: 21
LOS: 1 days | End: 2021-04-16

## 2021-04-16 ENCOUNTER — NON-APPOINTMENT (OUTPATIENT)
Age: 21
End: 2021-04-16

## 2021-04-16 ENCOUNTER — APPOINTMENT (OUTPATIENT)
Dept: OBGYN | Facility: HOSPITAL | Age: 21
End: 2021-04-16

## 2021-04-16 VITALS
HEIGHT: 61 IN | DIASTOLIC BLOOD PRESSURE: 76 MMHG | HEART RATE: 88 BPM | BODY MASS INDEX: 31.15 KG/M2 | TEMPERATURE: 98.4 F | SYSTOLIC BLOOD PRESSURE: 112 MMHG | WEIGHT: 165 LBS

## 2021-04-16 DIAGNOSIS — Z86.39 PERSONAL HISTORY OF OTHER ENDOCRINE, NUTRITIONAL AND METABOLIC DISEASE: ICD-10-CM

## 2021-04-16 DIAGNOSIS — R79.89 OTHER SPECIFIED ABNORMAL FINDINGS OF BLOOD CHEMISTRY: ICD-10-CM

## 2021-04-16 DIAGNOSIS — Z92.29 PERSONAL HISTORY OF OTHER DRUG THERAPY: ICD-10-CM

## 2021-04-16 DIAGNOSIS — Z23 ENCOUNTER FOR IMMUNIZATION: ICD-10-CM

## 2021-04-16 DIAGNOSIS — Z34.03 ENCOUNTER FOR SUPERVISION OF NORMAL FIRST PREGNANCY, THIRD TRIMESTER: ICD-10-CM

## 2021-04-16 DIAGNOSIS — Z98.891 HISTORY OF UTERINE SCAR FROM PREVIOUS SURGERY: ICD-10-CM

## 2021-04-16 DIAGNOSIS — Z48.89 ENCOUNTER FOR OTHER SPECIFIED SURGICAL AFTERCARE: ICD-10-CM

## 2021-04-16 NOTE — HISTORY OF PRESENT ILLNESS
[Postpartum Follow Up] : postpartum follow up [Complications:___] : complications include: [unfilled] [Last Pap Date: ___] : Last Pap Date: [unfilled] [Delivery Date: ___] : on [unfilled] [Primary C/S] : delivered by  section [Male] : Delivery History: baby boy [Wt. ___] : weighing [unfilled] [Rhogam] : Rhogam was not administered [Rubella Vaccine] : Rubella vaccine was not administered [Pertussis Vaccine] : Pertussis vaccine administered [BTL] : no tubal ligation [Breastfeeding] : not currently nursing [Discharge HCT: ___] : hematocrit level was [unfilled] [Discharge HGB: ___] : hemoglobin level was [unfilled] [Resumed Menses] : has not resumed her menses [Resumed [place]] : has not resumed intercourse [Intended Contraception] : Intended Contraception: [Condoms] : condoms [S/Sx PP Depression] : no signs/symptoms of postpartum depression [Clean/Dry/Intact] : clean, dry and intact [Back to Normal] : is back to normal in size [None] : no vaginal bleeding [Normal] : the vagina was normal [Examination Of The Breasts] : breasts are normal [Doing Well] : is doing well [FreeTextEntry8] : Here for postpartum exam. [de-identified] : For Nexplanon- will schedule in next 2 weeks. Condoms dispenced [de-identified] : Bottlefeeding/happy with baby. Rx Prenatal vits 1 tab po once daily. RX Prenatal vits 1 tab po once daily. Condoms dispenced- wants Nexplanon- will schedule appt. PAP 8/2020 LGSIL- notified that repeat PAP in 8/2021.  with patient today. MHegarty NP

## 2021-04-20 DIAGNOSIS — Z30.09 ENCOUNTER FOR OTHER GENERAL COUNSELING AND ADVICE ON CONTRACEPTION: ICD-10-CM

## 2021-04-20 DIAGNOSIS — Z98.891 HISTORY OF UTERINE SCAR FROM PREVIOUS SURGERY: ICD-10-CM

## 2021-04-30 ENCOUNTER — APPOINTMENT (OUTPATIENT)
Dept: OBGYN | Facility: HOSPITAL | Age: 21
End: 2021-04-30

## 2021-05-05 ENCOUNTER — APPOINTMENT (OUTPATIENT)
Dept: OBGYN | Facility: HOSPITAL | Age: 21
End: 2021-05-05

## 2021-05-12 ENCOUNTER — APPOINTMENT (OUTPATIENT)
Dept: OBGYN | Facility: HOSPITAL | Age: 21
End: 2021-05-12

## 2021-05-12 ENCOUNTER — OUTPATIENT (OUTPATIENT)
Dept: OUTPATIENT SERVICES | Facility: HOSPITAL | Age: 21
LOS: 1 days | End: 2021-05-12

## 2021-05-12 ENCOUNTER — RESULT CHARGE (OUTPATIENT)
Age: 21
End: 2021-05-12

## 2021-05-12 VITALS
DIASTOLIC BLOOD PRESSURE: 79 MMHG | HEART RATE: 103 BPM | SYSTOLIC BLOOD PRESSURE: 122 MMHG | WEIGHT: 165 LBS | TEMPERATURE: 98.3 F

## 2021-05-12 DIAGNOSIS — Z30.017 ENCOUNTER FOR INITIAL PRESCRIPTION OF IMPLANTABLE SUBDERMAL CONTRACEPTIVE: ICD-10-CM

## 2021-05-12 RX ORDER — ETONOGESTREL 68 MG/1
68 IMPLANT SUBCUTANEOUS
Refills: 0 | Status: ACTIVE | COMMUNITY
Start: 2021-05-12

## 2021-05-14 DIAGNOSIS — Z30.017 ENCOUNTER FOR INITIAL PRESCRIPTION OF IMPLANTABLE SUBDERMAL CONTRACEPTIVE: ICD-10-CM

## 2021-07-12 ENCOUNTER — EMERGENCY (EMERGENCY)
Facility: HOSPITAL | Age: 21
LOS: 1 days | Discharge: HOME | End: 2021-07-12
Attending: EMERGENCY MEDICINE | Admitting: EMERGENCY MEDICINE
Payer: MEDICAID

## 2021-07-12 VITALS
RESPIRATION RATE: 15 BRPM | DIASTOLIC BLOOD PRESSURE: 80 MMHG | SYSTOLIC BLOOD PRESSURE: 109 MMHG | OXYGEN SATURATION: 99 % | HEIGHT: 61 IN | TEMPERATURE: 98 F | HEART RATE: 128 BPM

## 2021-07-12 DIAGNOSIS — F32.2 MAJOR DEPRESSIVE DISORDER, SINGLE EPISODE, SEVERE WITHOUT PSYCHOTIC FEATURES: ICD-10-CM

## 2021-07-12 LAB
ALBUMIN SERPL ELPH-MCNC: 4.4 G/DL — SIGNIFICANT CHANGE UP (ref 3.3–5)
ALP SERPL-CCNC: 123 U/L — HIGH (ref 40–120)
ALT FLD-CCNC: 30 U/L — SIGNIFICANT CHANGE UP (ref 4–33)
AMPHET UR-MCNC: NEGATIVE — SIGNIFICANT CHANGE UP
ANION GAP SERPL CALC-SCNC: 14 MMOL/L — SIGNIFICANT CHANGE UP (ref 7–14)
APAP SERPL-MCNC: <15 UG/ML — SIGNIFICANT CHANGE UP (ref 15–25)
AST SERPL-CCNC: 21 U/L — SIGNIFICANT CHANGE UP (ref 4–32)
B PERT DNA SPEC QL NAA+PROBE: SIGNIFICANT CHANGE UP
BARBITURATES UR SCN-MCNC: NEGATIVE — SIGNIFICANT CHANGE UP
BASOPHILS # BLD AUTO: 0.03 K/UL — SIGNIFICANT CHANGE UP (ref 0–0.2)
BASOPHILS NFR BLD AUTO: 0.4 % — SIGNIFICANT CHANGE UP (ref 0–2)
BENZODIAZ UR-MCNC: NEGATIVE — SIGNIFICANT CHANGE UP
BILIRUB SERPL-MCNC: <0.2 MG/DL — SIGNIFICANT CHANGE UP (ref 0.2–1.2)
BUN SERPL-MCNC: 11 MG/DL — SIGNIFICANT CHANGE UP (ref 7–23)
C PNEUM DNA SPEC QL NAA+PROBE: SIGNIFICANT CHANGE UP
CALCIUM SERPL-MCNC: 9.6 MG/DL — SIGNIFICANT CHANGE UP (ref 8.4–10.5)
CHLORIDE SERPL-SCNC: 105 MMOL/L — SIGNIFICANT CHANGE UP (ref 98–107)
CO2 SERPL-SCNC: 20 MMOL/L — LOW (ref 22–31)
COCAINE METAB.OTHER UR-MCNC: NEGATIVE — SIGNIFICANT CHANGE UP
COVID-19 SPIKE DOMAIN AB INTERP: NEGATIVE — SIGNIFICANT CHANGE UP
COVID-19 SPIKE DOMAIN ANTIBODY RESULT: 0.4 U/ML — SIGNIFICANT CHANGE UP
CREAT SERPL-MCNC: 0.73 MG/DL — SIGNIFICANT CHANGE UP (ref 0.5–1.3)
CREATININE URINE RESULT, DAU: 78 MG/DL — SIGNIFICANT CHANGE UP
EOSINOPHIL # BLD AUTO: 0.07 K/UL — SIGNIFICANT CHANGE UP (ref 0–0.5)
EOSINOPHIL NFR BLD AUTO: 0.9 % — SIGNIFICANT CHANGE UP (ref 0–6)
ETHANOL SERPL-MCNC: <10 MG/DL — SIGNIFICANT CHANGE UP
FLUAV SUBTYP SPEC NAA+PROBE: SIGNIFICANT CHANGE UP
FLUBV RNA SPEC QL NAA+PROBE: SIGNIFICANT CHANGE UP
GLUCOSE SERPL-MCNC: 102 MG/DL — HIGH (ref 70–99)
HADV DNA SPEC QL NAA+PROBE: SIGNIFICANT CHANGE UP
HCG UR QL: NEGATIVE — SIGNIFICANT CHANGE UP
HCOV 229E RNA SPEC QL NAA+PROBE: SIGNIFICANT CHANGE UP
HCOV HKU1 RNA SPEC QL NAA+PROBE: SIGNIFICANT CHANGE UP
HCOV NL63 RNA SPEC QL NAA+PROBE: SIGNIFICANT CHANGE UP
HCOV OC43 RNA SPEC QL NAA+PROBE: SIGNIFICANT CHANGE UP
HCT VFR BLD CALC: 41.8 % — SIGNIFICANT CHANGE UP (ref 34.5–45)
HGB BLD-MCNC: 13.7 G/DL — SIGNIFICANT CHANGE UP (ref 11.5–15.5)
HMPV RNA SPEC QL NAA+PROBE: SIGNIFICANT CHANGE UP
HPIV1 RNA SPEC QL NAA+PROBE: SIGNIFICANT CHANGE UP
HPIV2 RNA SPEC QL NAA+PROBE: SIGNIFICANT CHANGE UP
HPIV3 RNA SPEC QL NAA+PROBE: SIGNIFICANT CHANGE UP
HPIV4 RNA SPEC QL NAA+PROBE: SIGNIFICANT CHANGE UP
IANC: 4.74 K/UL — SIGNIFICANT CHANGE UP (ref 1.5–8.5)
IMM GRANULOCYTES NFR BLD AUTO: 0.3 % — SIGNIFICANT CHANGE UP (ref 0–1.5)
LYMPHOCYTES # BLD AUTO: 2.52 K/UL — SIGNIFICANT CHANGE UP (ref 1–3.3)
LYMPHOCYTES # BLD AUTO: 32.1 % — SIGNIFICANT CHANGE UP (ref 13–44)
MCHC RBC-ENTMCNC: 28.8 PG — SIGNIFICANT CHANGE UP (ref 27–34)
MCHC RBC-ENTMCNC: 32.8 GM/DL — SIGNIFICANT CHANGE UP (ref 32–36)
MCV RBC AUTO: 88 FL — SIGNIFICANT CHANGE UP (ref 80–100)
METHADONE UR-MCNC: NEGATIVE — SIGNIFICANT CHANGE UP
MONOCYTES # BLD AUTO: 0.48 K/UL — SIGNIFICANT CHANGE UP (ref 0–0.9)
MONOCYTES NFR BLD AUTO: 6.1 % — SIGNIFICANT CHANGE UP (ref 2–14)
NEUTROPHILS # BLD AUTO: 4.74 K/UL — SIGNIFICANT CHANGE UP (ref 1.8–7.4)
NEUTROPHILS NFR BLD AUTO: 60.2 % — SIGNIFICANT CHANGE UP (ref 43–77)
NRBC # BLD: 0 /100 WBCS — SIGNIFICANT CHANGE UP
NRBC # FLD: 0 K/UL — SIGNIFICANT CHANGE UP
OPIATES UR-MCNC: NEGATIVE — SIGNIFICANT CHANGE UP
OXYCODONE UR-MCNC: POSITIVE
PCP SPEC-MCNC: SIGNIFICANT CHANGE UP
PCP UR-MCNC: NEGATIVE — SIGNIFICANT CHANGE UP
PLATELET # BLD AUTO: 423 K/UL — HIGH (ref 150–400)
POTASSIUM SERPL-MCNC: 4.3 MMOL/L — SIGNIFICANT CHANGE UP (ref 3.5–5.3)
POTASSIUM SERPL-SCNC: 4.3 MMOL/L — SIGNIFICANT CHANGE UP (ref 3.5–5.3)
PROT SERPL-MCNC: 7.9 G/DL — SIGNIFICANT CHANGE UP (ref 6–8.3)
RAPID RVP RESULT: SIGNIFICANT CHANGE UP
RBC # BLD: 4.75 M/UL — SIGNIFICANT CHANGE UP (ref 3.8–5.2)
RBC # FLD: 11.6 % — SIGNIFICANT CHANGE UP (ref 10.3–14.5)
RSV RNA SPEC QL NAA+PROBE: SIGNIFICANT CHANGE UP
RV+EV RNA SPEC QL NAA+PROBE: SIGNIFICANT CHANGE UP
SALICYLATES SERPL-MCNC: <5 MG/DL — LOW (ref 15–30)
SARS-COV-2 IGG+IGM SERPL QL IA: 0.4 U/ML — SIGNIFICANT CHANGE UP
SARS-COV-2 IGG+IGM SERPL QL IA: NEGATIVE — SIGNIFICANT CHANGE UP
SARS-COV-2 RNA SPEC QL NAA+PROBE: SIGNIFICANT CHANGE UP
SODIUM SERPL-SCNC: 139 MMOL/L — SIGNIFICANT CHANGE UP (ref 135–145)
THC UR QL: POSITIVE
TOXICOLOGY SCREEN, DRUGS OF ABUSE, SERUM RESULT: SIGNIFICANT CHANGE UP
TSH SERPL-MCNC: 1.75 UIU/ML — SIGNIFICANT CHANGE UP (ref 0.27–4.2)
WBC # BLD: 7.86 K/UL — SIGNIFICANT CHANGE UP (ref 3.8–10.5)
WBC # FLD AUTO: 7.86 K/UL — SIGNIFICANT CHANGE UP (ref 3.8–10.5)

## 2021-07-12 PROCEDURE — 99285 EMERGENCY DEPT VISIT HI MDM: CPT

## 2021-07-12 RX ORDER — SODIUM CHLORIDE 9 MG/ML
1000 INJECTION, SOLUTION INTRAVENOUS ONCE
Refills: 0 | Status: COMPLETED | OUTPATIENT
Start: 2021-07-12 | End: 2021-07-12

## 2021-07-12 RX ADMIN — SODIUM CHLORIDE 1000 MILLILITER(S): 9 INJECTION, SOLUTION INTRAVENOUS at 10:20

## 2021-07-12 NOTE — ED BEHAVIORAL HEALTH ASSESSMENT NOTE - OTHER PAST PSYCHIATRIC HISTORY (INCLUDE DETAILS REGARDING ONSET, COURSE OF ILLNESS, INPATIENT/OUTPATIENT TREATMENT)
pt reports history of depression when she was in high school with some passive SI, but never received a formal diagnosis. Did talk therapy for 2 yrs in high school

## 2021-07-12 NOTE — ED PROVIDER NOTE - NS ED ROS FT
CONSTITUTIONAL: No weakness, fevers  EYES/ENT: No visual changes;  No vertigo or throat pain   NECK: No pain or stiffness  RESPIRATORY: No cough. No shortness of breath  CARDIOVASCULAR: No chest pain or palpitations  GASTROINTESTINAL: No abdominal or epigastric pain. No nausea, vomiting. No diarrhea or constipation.   GENITOURINARY: No dysuria, frequency or hematuria  NEUROLOGICAL: No numbness or weakness  SKIN: No itching, burning, rashes, or lesions     All other review of systems is negative unless indicated above.

## 2021-07-12 NOTE — ED PROVIDER NOTE - PHYSICAL EXAMINATION
PHYSICAL EXAM:    GENERAL: Lying in bed comfortably  HEAD:  Normocephalic  EYES: EOMI, PERRLA, conjunctiva and sclera clear  ENT: No erythema/pallor/petechiae/lesions  NECK: Supple  LUNG: CTA b/l; no r/r/w  HEART: RRR, +S1/S2; No m/r/g  ABDOMEN: soft, NT/ND; BS audible   EXTREMITIES:  2+ Peripheral Pulses. No clubbing, cyanosis, or edema  NERVOUS SYSTEM:  AAOx3, speech clear. No sensory/motor deficits   SKIN: No rashes or lesions

## 2021-07-12 NOTE — ED BEHAVIORAL HEALTH ASSESSMENT NOTE - CASE SUMMARY
21F, 4 months post partum, presents after an overdose in a suicide attempt. Patient reports several stressors, ingested the remainder of her oxycodone along with alcohol, and did not tell anyone. Family discovered empty bottle and sent her to the ED. She remains ambivalent about being alive and her future. She endorses depressive symptoms and is a high risk to harm herself requiring inpatient level of care. Agreeable to voluntary. In good behavioral control, would not recommend 1:1 in locked supervised setting. 21F, 4 months post partum, presents after an overdose in a suicide attempt. Patient reports several stressors, ingested the remainder of her oxycodone along with alcohol, and did not tell anyone. Family discovered empty bottle and sent her to the ED. She remains ambivalent about being alive and her future. She endorses depressive symptoms and is a high risk to harm herself requiring inpatient level of care. Agreeable to voluntary. In good behavioral control, would not recommend 1:1 in locked supervised setting.    PATIENT RETRACTED VOLUNTARIES, COLLATERAL OBTAINED, PATIENT ABLE TO SAFETY PLAN EFFECTIVELY, FAMILY HAS NO SAFETY CONCERNS AND WANTS TO PICK HER UP.

## 2021-07-12 NOTE — ED BEHAVIORAL HEALTH ASSESSMENT NOTE - DESCRIPTION
highest lvl of education: high school, Pt was A&Ox3 on arrival, given fluids, vitals stable. With some nausea/vomiting. Did not require prn psych meds.   Vital Signs Last 24 Hrs  T(C): 36.6 (12 Jul 2021 13:39), Max: 36.9 (12 Jul 2021 08:01)  T(F): 97.9 (12 Jul 2021 13:39), Max: 98.5 (12 Jul 2021 08:01)  HR: 84 (12 Jul 2021 13:39) (84 - 128)  BP: 117/84 (12 Jul 2021 13:39) (109/80 - 117/84)  BP(mean): --  RR: 15 (12 Jul 2021 13:39) (15 - 15)  SpO2: 99% (12 Jul 2021 13:39) (99% - 99%) none congenital deafness

## 2021-07-12 NOTE — ED PROVIDER NOTE - OBJECTIVE STATEMENT
21 Yr old female (sign language user only) no comorbids recent [facility] 03/01 now presents with drug overdose.   Per patient, she has been having recent arguments with  after the birth of her child, has been having crying spells and feels unhappy.   Today at  6 AM, she took 9 pills of her Oxycodone 5 mg with 3 shots of alcohol after which felt dizzy and sleepy.   No nausea, vomiting. No prior similar episodes in past. 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   Per patient, she has been having recent arguments with  after the birth of her child, has been having crying spells and feels unhappy.   Today at  6 AM, she took 9 pills of her Oxycodone 5 mg with 3 shots of alcohol after which felt dizzy and sleepy.   No nausea, vomiting. No prior similar episodes in past.

## 2021-07-12 NOTE — ED PROVIDER NOTE - NSFOLLOWUPCLINICS_GEN_ALL_ED_FT
[facility]  Behavioral Health  [address] [place] [address]  Phone: [phone]  Fax:

## 2021-07-12 NOTE — ED ADULT NURSE NOTE - CHPI ED NUR SYMPTOMS NEG
no abdominal distension/no abdominal pain/no chills/no confusion/no disorientation/no nausea/no pain/no vomiting/no weakness

## 2021-07-12 NOTE — ED PROVIDER NOTE - PROGRESS NOTE DETAILS
Behavioral health informed.    informed. Grady VALLEJO: Pt signed out to me.  She is medically cleared.  This morning she has been reassessed by psychiatry and is able to participate in safety planning.  Pt's mother will come to pick her up.  Pt is stable for dc home with mother and plan for close crisis center follow-up.

## 2021-07-12 NOTE — ED PROVIDER NOTE - NSFOLLOWUPINSTRUCTIONS_ED_ALL_ED_FT
TAKE YOUR MEDICATIONS AS PRESCRIBED.  FOLLOW UP IN 24-48 HOURS WITH YOUR PSYCHIATRIST OR AT THE [facility] ([phone]).  RETURN TO THE EMERGENCY DEPARTMENT IF YOUR SYMPTOMS WORSEN, IF YOU HAVE THOUGHTS OF HARMING YOURSELF, OR IF YOU HAVE ANY ADDITIONAL CONCERNS.

## 2021-07-12 NOTE — ED BEHAVIORAL HEALTH NOTE - BEHAVIORAL HEALTH NOTE
Writer contacted pt’s significant other, Matthew Odonnlel, at [phone] to notify of pt's admission. Line picked up by  696 who provides sign language interpretation via contact to listed phone number.  received VM for Matthew with message left by writer interpreted.  phone number [phone] left for return call. Writer contacted pt’s significant other, Matthew Odonnell, at [phone] to notify of pt's admission. Line picked up by  [id number] who provides sign language interpretation via contact to listed phone number.  received VM for Matthew with message left by writer interpreted.  phone number [phone] left for return call.    Writer later met with pt's significant other, Matthew Odonnell, in waiting area. Communication completed via writing on pt's telephone due to hearing impairment. Significant aware of pt's admission and confirmed infant son will be cared for by him and pt's family in her absence. As per prior  documentation ACS report was made earlier in day.

## 2021-07-12 NOTE — ED ADULT NURSE NOTE - NSIMPLEMENTINTERV_GEN_ALL_ED
Implemented All Fall Risk Interventions:  Leonardo to call system. Call bell, personal items and telephone within reach. Instruct patient to call for assistance. Room bathroom lighting operational. Non-slip footwear when patient is off stretcher. Physically safe environment: no spills, clutter or unnecessary equipment. Stretcher in lowest position, wheels locked, appropriate side rails in place. Provide visual cue, wrist band, yellow gown, etc. Monitor gait and stability. Monitor for mental status changes and reorient to person, place, and time. Review medications for side effects contributing to fall risk. Reinforce activity limits and safety measures with patient and family.

## 2021-07-12 NOTE — ED PROVIDER NOTE - PATIENT PORTAL LINK FT
You can access the FollowMyHealth Patient Portal offered by [facility] by registering at the following website: [email or web address]. By joining [facility]’s FollowMyHealth portal, you will also be able to view your health information using other applications (apps) compatible with our system.

## 2021-07-12 NOTE — ED BEHAVIORAL HEALTH ASSESSMENT NOTE - PSYCHIATRIC ISSUES AND PLAN (INCLUDE STANDING AND PRN MEDICATION)
Start Zoloft 50mg daily for depression. PRN's : Haldol 5mg po/im, Ativan 2mg po/im, Benadryl 50mg po/im q6.

## 2021-07-12 NOTE — ED ADULT TRIAGE NOTE - CHIEF COMPLAINT QUOTE
alert oriented sign  # [id number] asad used  states she took approximately 9 oxycodone 10 mg tablets 1 hour ago     states she was trying to kill herself due to emotional pain her 4 month old baby will not sleep  has hx of depression also drank 3 shots of tequila   had left over oxy from child birth  complaining of left arm tightness  feels heart racing

## 2021-07-12 NOTE — ED BEHAVIORAL HEALTH ASSESSMENT NOTE - RISK ASSESSMENT
Risk factors: presenting symptoms (s/p SA, depressed mood, hopelessness), triggering event/stressor (Partner's infidelity, new mother), not in treatment currently, loss of her best friend ~ 4mo ago.     Protective factors: supportive family, sense of responsibility toward her son High Acute Suicide Risk

## 2021-07-12 NOTE — ED ADULT NURSE NOTE - CHIEF COMPLAINT QUOTE
alert oriented sign  # [id number] asad used  states she took approximately 9 oxycodone 10 mg tablets 1 hour ago     states she was trying to kill herself due to emotional pain her 4 month old baby will not sleep  has hx of depression also drank 3 shots of tequila   had left over oxy from child birth  complaining of left arm tightness  feels heart racing

## 2021-07-12 NOTE — ED BEHAVIORAL HEALTH ASSESSMENT NOTE - HPI (INCLUDE ILLNESS QUALITY, SEVERITY, DURATION, TIMING, CONTEXT, MODIFYING FACTORS, ASSOCIATED SIGNS AND SYMPTOMS)
The patient is a 21 year old young woman, domiciled with family, son and significant other, single, unemployed, no formal PPHx, no current treatment, no hx SA/NSSIB, no hx violence or legal issues, no substance abuse history BIB significant other via walk-in after intentional overdose on 9x5mg oxycodone + 3 shots of tequila with intent to end her life.     Pt is with congenital deafness, [place]  ID: [id number]. On interview, patient appears tired, is cooperative with interview. Initially accompanied by her significant other, Matthew Odonnell, (father of her child) who was asked to relocate for the duration of the interview. Regarding the details of her overdose, pt states she had not planned to overdose today. This morning, she woke up, fed her son, Ruben, and was playing with him when she had overwhelming feelings of hopelessness which persisted for about 1 hour. At that time, she went and took the entirety of what was left of her oxy prescription from her . States she didn't think about doing this for very long before she acted, maybe a few minutes. However, she does admit to experiencing SI over the past couple of months at least and has thought before of overdosing, but has never before acted on any thoughts of suicide. Denies having thought of any other ways to harm herself. After taking the pills, she didn't tell anyone. It was her baby's father, Matthew, who noticed she had taken the pills when he found the empty pill bottle in the bathroom. Patient states she wanted to die when she took the pills and does not think she would have told anyone what she did if he hadn't found out. Currently, she feels indifferent about the attempt, expresses remorse only due to the fact that she wouldn't want to do that to her son. Admits she still has a difficult time imaging herself feeling any better in the future, but that her son is her main reason to keep on living.     Patient gives some context surrounding recent stressors contributing to her acute presentation. ~6 months into her pregnancy, the patient discovered that the baby's father was cheating on her with another person. He has been living with the patient and her family since she became pregnant and the two have been arguing frequently since the discovery of his infidelity. It was around this time when the patient notes a decline in her mood began. She reports feeling worthless, with anhedonia, difficulty sleeping, poor appetite, decreased energy and more recently thoughts of wanting to kill herself. Pt denies domestic violence, states "he'd never hurt me physically, I just can't trust him anymore." The two have been trying to work out their issues but have been unsuccessful and pt attributes most of her stress to this.     Though she has no formal psychiatric diagnoses, patient endorses another episode in high school when she was depressed and had thoughts of suicide which she never acted upon. She went to therapy at this times weekly for a couple of years at this time. Has not been The patient is a 21 year old young woman, domiciled with family, son and significant other, single, unemployed, no formal PPHx, no current treatment, no hx SA/NSSIB, no hx violence or legal issues, no substance abuse history BIB significant other via walk-in after intentional overdose on 9x5mg oxycodone + 3 shots of tequila with intent to end her life.     Pt is with congenital deafness, [place]  ID: [id number]. On interview, patient appears tired, is cooperative with interview. Initially accompanied by her significant other, Matthew Odonnell, (father of her child) who was asked to relocate for the duration of the interview. Regarding the details of her overdose, pt states she had not planned to overdose today. This morning, she woke up, fed her son, Ruben, and was playing with him when she had overwhelming feelings of hopelessness which persisted for about 1 hour. At that time, she went and took the entirety of what was left of her oxy prescription from her . States she didn't think about doing this for very long before she acted, maybe a few minutes. However, she does admit to experiencing SI over the past couple of months at least and has thought before of overdosing, but has never before acted on any thoughts of suicide. Denies having thought of any other ways to harm herself. After taking the pills, she didn't tell anyone. It was her baby's father, Matthew, who noticed she had taken the pills when he found the empty pill bottle in the bathroom. Patient states she wanted to die when she took the pills and does not think she would have told anyone what she did if he hadn't found out. Currently, she feels indifferent about the attempt, expresses remorse only due to the fact that she wouldn't want to do that to her son. Admits she still has a difficult time imaging herself feeling any better in the future, but that her son is her main reason to keep on living.     Patient gives some context surrounding recent stressors contributing to her acute presentation. ~6 months into her pregnancy, the patient discovered that the baby's father was cheating on her with another person. He has been living with the patient and her family since she became pregnant and the two have been arguing frequently since the discovery of his infidelity. It was around this time when the patient notes a decline in her mood began. She reports feeling worthless, with anhedonia, difficulty sleeping, poor appetite, decreased energy and more recently thoughts of wanting to kill herself. Pt denies domestic violence, states "he'd never hurt me physically, I just can't trust him anymore." The two have been trying to work out their issues but have been unsuccessful and pt attributes most of her stress to this.     Though she has no formal psychiatric diagnoses, patient endorses another episode in high school when she was depressed and had thoughts of suicide which she never acted upon. She went to therapy at this times weekly for a couple of years at this time. Has not been in therapy since. Denies symptoms of psychosis including AVH, paranoia or persecutory delusions. Denies history of manic symptoms. Denies symptoms of anxiety. Endorses continued SI, to some extent regrets that her attempt was unsuccessful. Denies HI or thoughts of wanting to hurt the baby.    Collateral from pt's significant other, Matthew Marinatcher [phone]  States he has noticed that patient has been depressed since finding out that he cheated on her. Admits the two have been arguing a lot, states pt "keeps repeating things", brings up things he has done in the past because she "can't seem to move on." States she has been caring for the baby and does not feel she has been neglectful, he does not have concerns for the baby's safety in her presence. He does report pt has made comments like "I feel like I don't want to be here" and that she wants to die over the past few months but has never before acted. He also notes that the patient's best friend  recently, and the  was on the same day her baby was born. He feels that the loss of her friend also is contributing to patient's current presentation.     Collateral from Pt's mother, Arielle [phone]  Arielle reports she has noticed the patient has seemed down/depressed over the past few months, but whenever she asks if something is wrong, patient denies struggles. Mom was at work when overdose occurred. Mom reports pt formula feeds only.

## 2021-07-12 NOTE — ED ADULT NURSE NOTE - OBJECTIVE STATEMENT
20 y/o female presents to ED with c/o intentional OD.  Pt states that she took all of the left over Oxycodone from her  in March as well as a shot of Tequila.  Via  pt states that she has been fighting with her partner regarding their 4 month old, the baby has been crying a lot and pt is feeling overwhelmed.  Pt has hx of depression, no previous suicide attempts.  Pt awake alert in NAD, VSS.  Provider at bedside, pt placed on constant observation upon arrival to ED, belongings secured.

## 2021-07-12 NOTE — PROVIDER CONTACT NOTE (OTHER) - ASSESSMENT
SW informed by MD that pt was brought in for overdose and has a 4month old baby at home. Pt has her  at bedside. Team concerned over safety of the child at home with family. SW contacted CPS ([phone]) to make report, : April took the call at 1:33PM and call id: [phone]. SW completed form. No further SW needs at this time. SW informed by MD that pt was brought in for overdose and has a 4month old baby at home. Pt has her  at bedside. Team concerned over safety of the child at home with family. SW contacted CPS ([phone]) to make report, : April took the call at 1:33PM and call id: [phone]. SW completed form. No further SW needs at this time.  SHEELA received a call from ACS worker, Patrizia Prince who will be assigned to the case. Patrizia's information is [phone], [email or web address]

## 2021-07-12 NOTE — ED BEHAVIORAL HEALTH NOTE - BEHAVIORAL HEALTH NOTE
COVID Exposure Screen- Patient  1.	*Have you had a COVID-19 test in the last 90 days?  (  ) Yes   (x ) No   (  ) Unknown- Reason: _____  IF YES PROCEED TO QUESTION #2. IF NO OR UNKNOWN, PLEASE SKIP TO QUESTION #3.  2.	Date of test(s) and result(s): 7/12/21  3.	*Have you tested positive for COVID-19 antibodies? (  ) Yes   (x) No   (  ) Unknown- Reason: _____  IF YES PROCEED TO QUESTION #4. IF NO or UNKNOWN, PLEASE SKIP TO QUESTION #5.  4.	Date of positive antibody test: 7/12/21  5.	*Have you received 2 doses of the COVID-19 vaccine? (  ) Yes   (x) No   (  ) Unknown- Reason: _____   IF YES PROCEED TO QUESTION #6. IF NO or UNKNOWN, PLEASE SKIP TO QUESTION #7.  6.	Date of second dose: ________  7.	*In the past 10 days, have you been around anyone with a positive COVID-19 test?* (  ) Yes   (x ) No   (  ) Unknown- Reason: ____  IF YES PROCEED TO QUESTION #8. IF NO or UNKNOWN, PLEASE SKIP TO QUESTION #13.  8.	Were you within 6 feet of them for at least 15 minutes? (  ) Yes   (  ) No   (  ) Unknown- Reason: _____  9.	Have you provided care for them? (  ) Yes   (  ) No   (  ) Unknown- Reason: ______  10.	Have you had direct physical contact with them (touched, hugged, or kissed them)? (  ) Yes   (  ) No    (  ) Unknown- Reason: _____  11.	Have you shared eating or drinking utensils with them? (  ) Yes   (  ) No    (  ) Unknown- Reason: ____  12.	Have they sneezed, coughed, or somehow gotten respiratory droplets on you? (  ) Yes   (  ) No    (  ) Unknown- Reason: ______  13.	*Have you been out of [place] within the past 10 days?* (  ) Yes   (x) No   (  ) Unknown- Reason: _____  IF YES PLEASE ANSWER THE FOLLOWING QUESTIONS:  14.	Which state/country have you been to? ______  15.	Were you there over 24 hours? (  ) Yes   (  ) No    (  ) Unknown- Reason: ______  16.	Date of return to [facility]: ______

## 2021-07-12 NOTE — ED BEHAVIORAL HEALTH ASSESSMENT NOTE - NSACTIVEVENT_PSY_ALL_CORE
Triggering events leading to humiliation, shame, and/or despair (e.g., Loss of relationship, financial or health status) (real or anticipated)/Perceived burden on family or others

## 2021-07-12 NOTE — ED PROVIDER NOTE - ATTENDING CONTRIBUTION TO CARE
I have personally performed a face to face bedside history and physical examination of this patient. I have discussed the history, examination, review of systems, assessment and plan of management with the resident. I have reviewed the electronic medical record and amended it to reflect my history, review of systems, physical exam, assessment and plan.    Brief HPI:  20 yo F [facility] congenital deafness (sign language user only) recent [facility] 03/01 now presents with drug overdose.   At 6 AM, patient took 9 pills of her Oxycodone 5 mg with 3 shots of alcohol after which felt dizzy and sleepy.  On arrival, she is sleeping but easily arousable, protecting airway, non-labored respirations.     Vitals:   Reviewed    Exam:    GEN:  Non-toxic appearing, non-distressed, speaking full sentences, non-diaphoretic, AAOx3  HEENT:  NCAT, neck supple, EOMI, PERRLA, sclera anicteric, no conjunctival pallor or injection, no stridor, normal voice, no tonsillar exudate, uvula midline  CV:  regular rhythm and rate, s1/s2 audible, no murmurs, rubs or gallops, peripheral pulses 2+ and symmetric  PULM:  non-labored respirations, lungs clear to auscultation bilaterally, no wheezes, crackles or rales  ABD:  non distended, non-tender, no rebound, no guarding, negative Navarro's sign, bowel sounds normal, no cvat  MSK:  no gross deformity, non-tender extremities and joints, range of motion grossly normal appearing, no extremity edema, extremities warm and well perfused   NEURO:  AAOx3, CN II-XII intact, motor 5/5 in upper and lower extremities bilaterally, sensation grossly intact in extremities and trunk  SKIN:  warm, dry, no rash or vesicles     A/P:  20 yo F [facility] congenital deafness (sign language user only) recent [facility] 03/01 now presents with intentional drug overdose at 6 am.  VSS.  Sleeping but easily arousable, protecting airway, spontaneous respirations.  No observed toxidrome or indication for narcan at this time.  Plan for labs, ekg, supportive care, 1:1 observation,  consult.  Dispo pending.

## 2021-07-12 NOTE — ED BEHAVIORAL HEALTH ASSESSMENT NOTE - SUMMARY
The patient is a 21 year old young woman, domiciled with family, son and significant other, single, unemployed, no formal PPHx, no current treatment, no hx SA/NSSIB, no hx violence or legal issues, no substance abuse history BIB significant other via walk-in after intentional overdose on 9x5mg oxycodone + 3 shots of tequila with intent to end her life.   At this time, patient remains with SI, is somewhat regretful that her attempt was unsuccessful, though she does feel guilt because she would never want to leave her son without a mother. Patient admits she likely would not have told anyone that she took the pills had her significant other not noticed the bottle of pills was empty. Pt states she did not know if the amount of medication she took was enough to kill herself but that she "wanted to hurt [herself]," Based on both patient and collateral reports, pt has been experiencing symptoms of depression for the past ~7 months (since 6 months pregnant) in the context of psychosocial stress including her significant other's infidelity and the loss of her best friend. At this time pt warrants admission to a psychiatric hospital for symptom stabilization and treatment.     PLAN:  -Admit to in psychiatry, 9.13 legal status  -Start Zoloft 50mg for mood  -PRN's: Haldol 5 po/im, Ativan 2 po/im, Benadryl 50 po/im The patient is a 21 year old young woman, domiciled with family, son and significant other, single, unemployed, no formal PPHx, no current treatment, no hx SA/NSSIB, no hx violence or legal issues, no substance abuse history BIB significant other via walk-in after intentional overdose on 9x5mg oxycodone + 3 shots of tequila with intent to end her life.   At this time, patient remains with SI, is somewhat regretful that her attempt was unsuccessful, though she does feel guilt because she would never want to leave her son without a mother. Patient admits she likely would not have told anyone that she took the pills had her significant other not noticed the bottle of pills was empty. Pt states she did not know if the amount of medication she took was enough to kill herself but that she "wanted to hurt [herself]," Based on both patient and collateral reports, pt has been experiencing symptoms of depression for the past ~7 months (since 6 months pregnant) in the context of psychosocial stress including her significant other's infidelity and the loss of her best friend. At this time pt warrants admission to a psychiatric hospital for symptom stabilization and treatment.     PLAN:  -Admit to in psychiatry, 9.13 legal status  -Start Zoloft 50mg for mood  -PRN's: Haldol 5 po/im, Ativan 2 po/im, Benadryl 50 po/im    REASSESSMENT at 7am -   Patient requested to leave at 330am. It was decided that team would wait until 7 am until they can connect with a family member. SW spoke with mom who had no safety concerns and wanted to come pick her up.  Patient was extremely remorseful for her behavior and regretted it. She denied any si/i/p,. She is motivated to be in treatment and is accepting of outpatient referrals. She was able to effectively safety plan with writer and  - [phone].

## 2021-07-12 NOTE — ED PROVIDER NOTE - CLINICAL SUMMARY MEDICAL DECISION MAKING FREE TEXT BOX
21 Yr old female h/o congenital deafness (sign language user only) recent [facility] 03/01 now presents with drug overdose on 9 pills of her Oxycodone 5 mg with 3 shots of alcohol at 6 AM. VS tachycardic o/w wnl, PE wnl. Labs, EKG, IVF, Psyche.

## 2021-07-13 VITALS
DIASTOLIC BLOOD PRESSURE: 62 MMHG | OXYGEN SATURATION: 100 % | HEART RATE: 99 BPM | RESPIRATION RATE: 16 BRPM | SYSTOLIC BLOOD PRESSURE: 100 MMHG

## 2021-07-13 NOTE — ED ADULT NURSE REASSESSMENT NOTE - NS ED NURSE REASSESS COMMENT FT1
Pt is currently laying in bed, sleeping, NAD, even unlabored respirations observed. Awaiting available bed for psychiatric admission. Will continue to monitor for safety.

## 2021-07-13 NOTE — ED ADULT NURSE REASSESSMENT NOTE - NS ED NURSE REASSESS COMMENT FT1
pt is currently awake, a&ox4, calm with sad affect. Psychiatrist at bedside. Vitals obtained. Will continue to monitor for safety.

## 2021-07-13 NOTE — ED BEHAVIORAL HEALTH NOTE - BEHAVIORAL HEALTH NOTE
At 330am patient requested to speak with writer.    was used - Mabel [id number]    Patient requesting to be discharged. She reports that she's very remorseful for what happened and she is eager to get home to her son as she is concerned that her boyfriend (who is also deaf) will not be able to hear him and care for him. She adamantly denies any SI/I/P, states she was overwhelmed and is motivated to work with an outpatient provider.   She is requesting that team speak with her mother and boyfriend again to discuss her discharge.   She is requesting to retract her voluntaries as she wants to go home.     Patient was informed that writer will return at 7am with  phone to speak with her mother and boyfriend to determine risk. If family feels patient is at elevated risk, she is agreeable to remaining in the hospital.   Patient agreeable to this plan.

## 2021-07-13 NOTE — ED BEHAVIORAL HEALTH NOTE - BEHAVIORAL HEALTH NOTE
Writer spoke to pt's mother Arielle Mcdaniel  to obtain collateral information. Pt and her son reside with her mother and pt's brother.  She states pt's boyfriend lives with his family.  She was at work yesterday when she received a call from her son saying pt's boyfriend had knocked on the door telling him patient had taken pills.  She states pt has never done anything like that before and from what she was told is that pt and her boyfriend had an argument yesterday.  She reports leaving work to go home, but patient had already been taken away via ambulance at that time.  She states patient takes very good care of her child and they were visited by a  yesterday who told her patient was at the hospital.  She believes this was an isolated incident and does not have any safety concerns.  She would like to transport patient home this morning if discharged.

## 2021-07-13 NOTE — ED ADULT NURSE REASSESSMENT NOTE - NS ED NURSE REASSESS COMMENT FT1
Received pt from RN break coverage. Pt is sleeping in bed, NAD, even unlabored respirations observed. VSS. Awaiting available bed for psychiatric admission. Will continue to monitor for safety.

## 2021-07-15 NOTE — ED BEHAVIORAL HEALTH NOTE - BEHAVIORAL HEALTH NOTE
Worker called patient for high risk follow up [phone]. Patient answered phone via  services. She states she is doing well and denies SI. Worker encouraged patient to utilize her safety plan for coping skills. Worker also encouraged patient to follow up with [facility]. Patient refused and states that she does not need worker to inform her about any alternate services at this time.

## 2021-09-02 ENCOUNTER — NON-APPOINTMENT (OUTPATIENT)
Age: 21
End: 2021-09-02

## 2022-02-23 NOTE — OB RN DELIVERY SUMMARY - NS_NUCHALCORD_OBGYN_ALL_OB
Plan: Recommend minimum of SPF 30+ daily to the sun exposed areas. Reapply every 2 hours. Detail Level: Zone x1

## 2022-03-17 NOTE — OB RN PREOPERATIVE CHECKLIST - NS_PREOPPTSENTTO_OBGYN_ALL_OB
Phillip Leblanc PA-C recommended a xray.   Order placed.  The patient may go to her local [facility] to have this done tonight.     The patient verbalized understanding and will call this clinic if any further concerns.    operating room

## 2022-06-07 ENCOUNTER — APPOINTMENT (OUTPATIENT)
Dept: OBGYN | Facility: HOSPITAL | Age: 22
End: 2022-06-07

## 2022-06-07 ENCOUNTER — OUTPATIENT (OUTPATIENT)
Dept: OUTPATIENT SERVICES | Facility: HOSPITAL | Age: 22
LOS: 1 days | End: 2022-06-07

## 2022-06-07 VITALS
DIASTOLIC BLOOD PRESSURE: 75 MMHG | SYSTOLIC BLOOD PRESSURE: 120 MMHG | HEART RATE: 98 BPM | HEIGHT: 61 IN | WEIGHT: 183 LBS | TEMPERATURE: 97.9 F | BODY MASS INDEX: 34.55 KG/M2

## 2022-06-07 DIAGNOSIS — Z30.46 ENCOUNTER FOR SURVEILLANCE OF IMPLANTABLE SUBDERMAL CONTRACEPTIVE: ICD-10-CM

## 2022-06-08 DIAGNOSIS — Z30.46 ENCOUNTER FOR SURVEILLANCE OF IMPLANTABLE SUBDERMAL CONTRACEPTIVE: ICD-10-CM

## 2022-08-09 ENCOUNTER — APPOINTMENT (OUTPATIENT)
Dept: OBGYN | Facility: HOSPITAL | Age: 22
End: 2022-08-09

## 2022-10-07 ENCOUNTER — RESULT CHARGE (OUTPATIENT)
Age: 22
End: 2022-10-07

## 2022-10-07 ENCOUNTER — OUTPATIENT (OUTPATIENT)
Dept: OUTPATIENT SERVICES | Facility: HOSPITAL | Age: 22
LOS: 1 days | End: 2022-10-07

## 2022-10-07 ENCOUNTER — APPOINTMENT (OUTPATIENT)
Dept: OBGYN | Facility: HOSPITAL | Age: 22
End: 2022-10-07

## 2022-10-07 DIAGNOSIS — Z32.00 ENCOUNTER FOR PREGNANCY TEST, RESULT UNKNOWN: ICD-10-CM

## 2022-10-07 LAB
HCG UR QL: NEGATIVE
QUALITY CONTROL: YES

## 2022-10-10 DIAGNOSIS — Z32.00 ENCOUNTER FOR PREGNANCY TEST, RESULT UNKNOWN: ICD-10-CM

## 2022-11-02 ENCOUNTER — APPOINTMENT (OUTPATIENT)
Dept: OBGYN | Facility: HOSPITAL | Age: 22
End: 2022-11-02

## 2023-04-11 ENCOUNTER — RESULT REVIEW (OUTPATIENT)
Age: 23
End: 2023-04-11

## 2023-04-11 ENCOUNTER — APPOINTMENT (OUTPATIENT)
Dept: OBGYN | Facility: HOSPITAL | Age: 23
End: 2023-04-11
Payer: MEDICAID

## 2023-04-11 ENCOUNTER — OUTPATIENT (OUTPATIENT)
Dept: OUTPATIENT SERVICES | Facility: HOSPITAL | Age: 23
LOS: 1 days | End: 2023-04-11
Payer: MEDICAID

## 2023-04-11 VITALS
DIASTOLIC BLOOD PRESSURE: 97 MMHG | BODY MASS INDEX: 33.99 KG/M2 | TEMPERATURE: 98.1 F | HEART RATE: 103 BPM | SYSTOLIC BLOOD PRESSURE: 121 MMHG | HEIGHT: 61 IN | WEIGHT: 180 LBS

## 2023-04-11 DIAGNOSIS — N91.5 OLIGOMENORRHEA, UNSPECIFIED: ICD-10-CM

## 2023-04-11 DIAGNOSIS — Z00.00 ENCOUNTER FOR GENERAL ADULT MEDICAL EXAMINATION W/OUT ABNORMAL FINDINGS: ICD-10-CM

## 2023-04-11 DIAGNOSIS — Z30.09 ENCOUNTER FOR OTHER GENERAL COUNSELING AND ADVICE ON CONTRACEPTION: ICD-10-CM

## 2023-04-11 LAB — HIV 1+2 AB+HIV1 P24 AG SERPL QL IA: SIGNIFICANT CHANGE UP

## 2023-04-11 PROCEDURE — 88141 CYTOPATH C/V INTERPRET: CPT

## 2023-04-11 PROCEDURE — 99213 OFFICE O/P EST LOW 20 MIN: CPT | Mod: GC

## 2023-04-11 PROCEDURE — 99395 PREV VISIT EST AGE 18-39: CPT | Mod: GC

## 2023-04-11 NOTE — HISTORY OF PRESENT ILLNESS
[FreeTextEntry1] : : [id number]\par Patient is a deaf 22 yo  here for an annual visit, LMP 3/31. She states that since her Nexplanon was removed 6 months ago she has missed some periods and this was concerning to her. She removed the Nexplanon 2/2 to weight gain and with the implant had no periods.\par \par Menses before Nexplanon were regular, with 5 BD and no intermenstrual bleeding.\par \par GYN\par -Last pap 202 LSIL never colpo\par \par Sexually active with one partner, and wants STD testing today.\par Does not want contraception\par Pt desiring Gardasil on this vist.\par \par OB\par  c/s\par  \par All: NKDA\par Meds: denies \par PMSH: Pt completely deaf. \par Social: Denies toxic habits, safe at home

## 2023-04-11 NOTE — PHYSICAL EXAM
[Appropriately responsive] : appropriately responsive [Soft] : soft [Non-tender] : non-tender [Non-distended] : non-distended [Oriented x3] : oriented x3 [Examination Of The Breasts] : a normal appearance [No Masses] : no breast masses were palpable [Labia Majora] : normal [Normal] : normal [FreeTextEntry6] : wnl, fibrocystic breast

## 2023-04-11 NOTE — DISCUSSION/SUMMARY
[FreeTextEntry1] : Patient is a deaf 22 yo  here for an annual visit, pt concerned for missing periods after Nexplanon removal 6 montha ago\par -pap\par -G/C\par -hpv\par -urine pregnancy test\par -STD testing\par -pt to get Gardasil at next appointment 6 months from now. \par \par Nahid Paula PGY 1\par dw Dr. Ortiz

## 2023-04-12 DIAGNOSIS — Z00.00 ENCOUNTER FOR GENERAL ADULT MEDICAL EXAMINATION WITHOUT ABNORMAL FINDINGS: ICD-10-CM

## 2023-04-12 DIAGNOSIS — Z30.09 ENCOUNTER FOR OTHER GENERAL COUNSELING AND ADVICE ON CONTRACEPTION: ICD-10-CM

## 2023-04-12 DIAGNOSIS — N91.5 OLIGOMENORRHEA, UNSPECIFIED: ICD-10-CM

## 2023-04-12 LAB
C TRACH RRNA SPEC QL NAA+PROBE: SIGNIFICANT CHANGE UP
HBV SURFACE AG SER-ACNC: SIGNIFICANT CHANGE UP
HCV AB S/CO SERPL IA: 0.14 S/CO — SIGNIFICANT CHANGE UP (ref 0–0.99)
HCV AB SERPL-IMP: SIGNIFICANT CHANGE UP
HPV HIGH+LOW RISK DNA PNL CVX: DETECTED
N GONORRHOEA RRNA SPEC QL NAA+PROBE: SIGNIFICANT CHANGE UP
SPECIMEN SOURCE: SIGNIFICANT CHANGE UP
T PALLIDUM AB TITR SER: NEGATIVE — SIGNIFICANT CHANGE UP

## 2023-04-14 LAB
HPV GENOTYPE 16: SIGNIFICANT CHANGE UP
HPV GENOTYPE 18/45: SIGNIFICANT CHANGE UP

## 2023-04-18 LAB
CYTOLOGY SPEC DOC CYTO: SIGNIFICANT CHANGE UP
HCG UR QL: NEGATIVE
QUALITY CONTROL: YES

## 2023-04-19 ENCOUNTER — NON-APPOINTMENT (OUTPATIENT)
Age: 23
End: 2023-04-19

## 2023-05-23 ENCOUNTER — APPOINTMENT (OUTPATIENT)
Dept: OBGYN | Facility: HOSPITAL | Age: 23
End: 2023-05-23

## 2023-06-13 ENCOUNTER — APPOINTMENT (OUTPATIENT)
Dept: OBGYN | Facility: CLINIC | Age: 23
End: 2023-06-13

## 2023-06-30 ENCOUNTER — APPOINTMENT (OUTPATIENT)
Dept: OBGYN | Facility: HOSPITAL | Age: 23
End: 2023-06-30

## 2023-06-30 ENCOUNTER — OUTPATIENT (OUTPATIENT)
Dept: OUTPATIENT SERVICES | Facility: HOSPITAL | Age: 23
LOS: 1 days | End: 2023-06-30

## 2023-06-30 VITALS
HEART RATE: 94 BPM | TEMPERATURE: 97.9 F | BODY MASS INDEX: 33.04 KG/M2 | WEIGHT: 175 LBS | DIASTOLIC BLOOD PRESSURE: 88 MMHG | HEIGHT: 61 IN | SYSTOLIC BLOOD PRESSURE: 113 MMHG

## 2023-06-30 DIAGNOSIS — N94.6 DYSMENORRHEA, UNSPECIFIED: ICD-10-CM

## 2023-06-30 DIAGNOSIS — R10.2 PELVIC AND PERINEAL PAIN: ICD-10-CM

## 2023-06-30 NOTE — DISCUSSION/SUMMARY
[FreeTextEntry1] : Pelvic Pain/Dysmenorrhea\par --pelvic sono referral\par --NSAID's prn\par \par Family planning/preconceptual counseling\par --desires pregnancy\par --discuss used fertility awareness and ovulation timing\par --diet and exercise\par --PNV daily\par Follow up with amenorrhea for pregnancy testing\par Patricia Yang present providing ASL interpretation

## 2023-06-30 NOTE — REASON FOR VISIT
[Follow-Up] : a follow-up evaluation of [Pelvic Pain] : pelvic pain [Interpreters_FullName] : Patricia Yang [TWNoteComboBox1] : American Sign Language

## 2023-06-30 NOTE — COUNSELING
[Nutrition/ Exercise/ Weight Management] : nutrition, exercise, weight management [Vitamins/Supplements] : vitamins/supplements [Contraception/ Emergency Contraception/ Safe Sexual Practices] : contraception, emergency contraception, safe sexual practices [Preconception Care/ Fertility options] : preconception care, fertility options [STD (testing, results, tx)] : STD (testing, results, tx)

## 2023-06-30 NOTE — HISTORY OF PRESENT ILLNESS
[FreeTextEntry1] : 24 yo  LMP 6/14/2023 here for follow up.  c/o very painful, crampy period with last cycle and feels the bleeding was much lighter than usual.   Was seen at Urgent care and had all negative cultures and told to have follow up with her Gyn.  Trying to conceive with her partner.

## 2023-08-15 ENCOUNTER — RESULT REVIEW (OUTPATIENT)
Age: 23
End: 2023-08-15

## 2023-08-15 ENCOUNTER — APPOINTMENT (OUTPATIENT)
Dept: OBGYN | Facility: HOSPITAL | Age: 23
End: 2023-08-15

## 2023-08-15 ENCOUNTER — OUTPATIENT (OUTPATIENT)
Dept: OUTPATIENT SERVICES | Facility: HOSPITAL | Age: 23
LOS: 1 days | End: 2023-08-15

## 2023-08-15 ENCOUNTER — TRANSCRIPTION ENCOUNTER (OUTPATIENT)
Age: 23
End: 2023-08-15

## 2023-08-15 VITALS
TEMPERATURE: 97.8 F | HEART RATE: 116 BPM | HEIGHT: 61 IN | WEIGHT: 177 LBS | SYSTOLIC BLOOD PRESSURE: 121 MMHG | DIASTOLIC BLOOD PRESSURE: 73 MMHG | BODY MASS INDEX: 33.42 KG/M2

## 2023-08-15 DIAGNOSIS — N92.6 IRREGULAR MENSTRUATION, UNSPECIFIED: ICD-10-CM

## 2023-08-15 DIAGNOSIS — Z64.0 PROBLEMS RELATED TO UNWANTED PREGNANCY: ICD-10-CM

## 2023-08-15 LAB
BASOPHILS # BLD AUTO: 0.02 K/UL — SIGNIFICANT CHANGE UP (ref 0–0.2)
BASOPHILS NFR BLD AUTO: 0.2 % — SIGNIFICANT CHANGE UP (ref 0–2)
EOSINOPHIL # BLD AUTO: 0.07 K/UL — SIGNIFICANT CHANGE UP (ref 0–0.5)
EOSINOPHIL NFR BLD AUTO: 0.8 % — SIGNIFICANT CHANGE UP (ref 0–6)
HCG UR QL: POSITIVE
HCT VFR BLD CALC: 41.3 % — SIGNIFICANT CHANGE UP (ref 34.5–45)
HGB BLD-MCNC: 13.6 G/DL — SIGNIFICANT CHANGE UP (ref 11.5–15.5)
IANC: 5.74 K/UL — SIGNIFICANT CHANGE UP (ref 1.8–7.4)
IMM GRANULOCYTES NFR BLD AUTO: 0.4 % — SIGNIFICANT CHANGE UP (ref 0–0.9)
LYMPHOCYTES # BLD AUTO: 2.51 K/UL — SIGNIFICANT CHANGE UP (ref 1–3.3)
LYMPHOCYTES # BLD AUTO: 28 % — SIGNIFICANT CHANGE UP (ref 13–44)
MCHC RBC-ENTMCNC: 28.7 PG — SIGNIFICANT CHANGE UP (ref 27–34)
MCHC RBC-ENTMCNC: 32.9 GM/DL — SIGNIFICANT CHANGE UP (ref 32–36)
MCV RBC AUTO: 87.1 FL — SIGNIFICANT CHANGE UP (ref 80–100)
MONOCYTES # BLD AUTO: 0.58 K/UL — SIGNIFICANT CHANGE UP (ref 0–0.9)
MONOCYTES NFR BLD AUTO: 6.5 % — SIGNIFICANT CHANGE UP (ref 2–14)
NEUTROPHILS # BLD AUTO: 5.74 K/UL — SIGNIFICANT CHANGE UP (ref 1.8–7.4)
NEUTROPHILS NFR BLD AUTO: 64.1 % — SIGNIFICANT CHANGE UP (ref 43–77)
NRBC # BLD: 0 /100 WBCS — SIGNIFICANT CHANGE UP (ref 0–0)
NRBC # FLD: 0 K/UL — SIGNIFICANT CHANGE UP (ref 0–0)
PLATELET # BLD AUTO: 383 K/UL — SIGNIFICANT CHANGE UP (ref 150–400)
QUALITY CONTROL: YES
RBC # BLD: 4.74 M/UL — SIGNIFICANT CHANGE UP (ref 3.8–5.2)
RBC # FLD: 11.9 % — SIGNIFICANT CHANGE UP (ref 10.3–14.5)
WBC # BLD: 8.96 K/UL — SIGNIFICANT CHANGE UP (ref 3.8–10.5)
WBC # FLD AUTO: 8.96 K/UL — SIGNIFICANT CHANGE UP (ref 3.8–10.5)

## 2023-08-15 RX ORDER — IBUPROFEN 600 MG/1
600 TABLET, FILM COATED ORAL 3 TIMES DAILY
Qty: 60 | Refills: 0 | Status: ACTIVE | COMMUNITY
Start: 2023-08-15 | End: 1900-01-01

## 2023-08-15 RX ORDER — MISOPROSTOL 200 UG/1
200 TABLET ORAL DAILY
Qty: 4 | Refills: 0 | Status: ACTIVE | COMMUNITY
Start: 2023-08-15 | End: 1900-01-01

## 2023-08-15 RX ORDER — MISOPROSTOL 200 UG/1
200 TABLET ORAL
Refills: 0 | Status: COMPLETED | OUTPATIENT
Start: 2023-08-15

## 2023-08-15 RX ADMIN — MISOPROSTOL 0 MCG: 200 TABLET ORAL at 00:00

## 2023-08-15 RX ADMIN — Medication 0 MG: at 00:00

## 2023-08-15 NOTE — COUNSELING
[FreeTextEntry2] :  The patient has been counseled regarding the following topics: Medical  Options for the pregnancy were discussed with the patient, including continuation of pregnancy, medical , dilation and curettage (D&C) in the office under local anesthesia or in the operating room under sedation. Given the pregnancy is undesired the patient would prefer not to continue the pregnancy. They do not desire to continue the pregnancy and are requesting a medical .   The risk of harm to subsequent pregnancies or the ability to carry a subsequent child to term, and adverse psychological effects was discussed.   The risks of medical  including:   - Cramping - Bleeding - Fever, diarrhea, nausea, vomiting, headache, dizziness, back pain, feeling tired - Emotional changes - Continuing pregnancy and the need for further medication or surgery - Blood clots in the uterus causing cramping and abdominal pain necessitation further medication or surgery - Incomplete  causing heavy bleeding, infection, or both (which may require other testing or treatments such as further medication or surgery) - Bleeding too much or too long which may require further treatment with medication or surgery, or a blood transfusion - Infection in the uterus, which may require further treatment with medication, surgery or antibiotics. It may also require hospital admission - Allergic reaction to any or all of the medications used - Death from any or all of the medications used     1. Patient agrees to undergo surgical  if medical  fails. 2.  The patient states they have a nearby hospital if the need for emergency services arises. 3. The patient states they have someone to be with them at the time of the medical .   The patient was thoroughly counseled on instructions for medical  and the warning signs of any problems. They voiced understanding of these warning signs and when to call and were provided with 24-hour contact information for on-call and available physicians. They were also informed that no promise can be made about the outcome of the  and that medical  is not reversible.   The patient also understands it is their responsibility to bring to the attention of their physician any unusual symptoms following the procedure and to report to follow-up phone calls and/or examinations.   They understand the need to call the office if they have no bleeding in 24 hours after misoprostol as this could mean either the medical  did not work, or something such as an ectopic pregnancy occurred.   The patient is sure of their decision and denies any coercion from family, friends or healthcare providers. The patient had the opportunity to ask questions and all questions were answered.

## 2023-08-15 NOTE — REASON FOR VISIT
[Follow-Up] : a follow-up evaluation of [FreeTextEntry2] : Missed period/pregnancy testing [Interpreters_FullName] : Patricia Yang [TWNoteComboBox1] : American Sign Language

## 2023-08-15 NOTE — HISTORY OF PRESENT ILLNESS
[FreeTextEntry1] : 22 yo hearing impaired (accompanied by ) [id number] @ 6.4 weeks by first trimester u/s presents with unplanned, undesired pregnancy. LMP 6/14/2023. She denies vaginal bleeding or cramping. She has been counseled regarding her options regarding pregnancy and she desires TOP.  Blood Type: O pos Sono: done today and CRL=6 weeks 4 day with yolk and +FHR seen.  No adnexal masses noted.

## 2023-08-29 ENCOUNTER — OUTPATIENT (OUTPATIENT)
Dept: OUTPATIENT SERVICES | Facility: HOSPITAL | Age: 23
LOS: 1 days | End: 2023-08-29

## 2023-08-29 ENCOUNTER — APPOINTMENT (OUTPATIENT)
Dept: OBGYN | Facility: HOSPITAL | Age: 23
End: 2023-08-29
Payer: MEDICAID

## 2023-08-29 VITALS
HEART RATE: 93 BPM | WEIGHT: 177 LBS | BODY MASS INDEX: 33.42 KG/M2 | DIASTOLIC BLOOD PRESSURE: 85 MMHG | HEIGHT: 61 IN | SYSTOLIC BLOOD PRESSURE: 118 MMHG | TEMPERATURE: 97.8 F

## 2023-08-29 DIAGNOSIS — R10.2 PELVIC AND PERINEAL PAIN: ICD-10-CM

## 2023-08-29 DIAGNOSIS — N92.6 IRREGULAR MENSTRUATION, UNSPECIFIED: ICD-10-CM

## 2023-08-29 PROCEDURE — 99213 OFFICE O/P EST LOW 20 MIN: CPT | Mod: 25

## 2023-08-29 NOTE — PHYSICAL EXAM
[Appropriately responsive] : appropriately responsive [Alert] : alert [No Acute Distress] : no acute distress [Regular Rate Rhythm] : regular rate rhythm [No Murmurs] : no murmurs [Soft] : soft [Non-tender] : non-tender [No Lesions] : no lesions [No Mass] : no mass [Oriented x3] : oriented x3 [Normal] : normal external genitalia

## 2023-08-30 DIAGNOSIS — Z00.00 ENCOUNTER FOR GENERAL ADULT MEDICAL EXAMINATION WITHOUT ABNORMAL FINDINGS: ICD-10-CM

## 2023-08-30 DIAGNOSIS — R10.2 PELVIC AND PERINEAL PAIN: ICD-10-CM

## 2023-08-30 DIAGNOSIS — Z64.0 PROBLEMS RELATED TO UNWANTED PREGNANCY: ICD-10-CM

## 2023-08-30 DIAGNOSIS — N92.6 IRREGULAR MENSTRUATION, UNSPECIFIED: ICD-10-CM

## 2023-09-01 NOTE — PROCEDURE
[Transvaginal Ultrasound] : transvaginal ultrasound [Anteverted] : anteverted [FreeTextEntry3] : Endometrium 5mm, no color flow visualized or evidence of POC

## 2023-09-01 NOTE — PLAN
[FreeTextEntry1] : 23 y.o.  presenting for follow up of med AB. Patient now reports bleeding stopped. TVUS performed with endometrial lining 5mm, no evidence of retained POC. Patient also offered birth control for which she declined. Patient interested in Gardasil vaccination, risk and benefits explained.  - Gardasil vaccination administered, patient to follow up for next series - Patient to schedule annual exam 2024 due to + HPV on last pap

## 2023-09-01 NOTE — HISTORY OF PRESENT ILLNESS
[FreeTextEntry1] : 23 y.o.  with hearing impairment (accompanied by ) presenting for follow up of med AB. Patient seen 8/15 with undesired pregnancy @6w4d. She took Mifepristone + Cytotec as prescribed- she had heavy bleeding and reports feeling like she passed tissue. Bleeding has subsequently stopped one or two days ago. Denies any abdominal pain, recent fevers, chills, nausea, vomiting, dizziness, lightheadedness.   Declines any desire for birth control. Was previously on Nexplanon, took out two years ago, reports she did not like the way it made her feel.

## 2023-10-10 ENCOUNTER — APPOINTMENT (OUTPATIENT)
Dept: OBGYN | Facility: HOSPITAL | Age: 23
End: 2023-10-10

## 2023-10-30 ENCOUNTER — APPOINTMENT (OUTPATIENT)
Dept: OBGYN | Facility: HOSPITAL | Age: 23
End: 2023-10-30

## 2024-01-09 ENCOUNTER — NON-APPOINTMENT (OUTPATIENT)
Age: 24
End: 2024-01-09

## 2024-01-22 ENCOUNTER — LABORATORY RESULT (OUTPATIENT)
Age: 24
End: 2024-01-22

## 2024-01-22 ENCOUNTER — APPOINTMENT (OUTPATIENT)
Dept: OBGYN | Facility: CLINIC | Age: 24
End: 2024-01-22
Payer: MEDICAID

## 2024-01-22 VITALS — WEIGHT: 180 LBS | DIASTOLIC BLOOD PRESSURE: 75 MMHG | SYSTOLIC BLOOD PRESSURE: 119 MMHG

## 2024-01-22 DIAGNOSIS — N91.2 AMENORRHEA, UNSPECIFIED: ICD-10-CM

## 2024-01-22 PROCEDURE — 36415 COLL VENOUS BLD VENIPUNCTURE: CPT

## 2024-01-22 PROCEDURE — 0501F PRENATAL FLOW SHEET: CPT

## 2024-01-22 RX ORDER — DOXYLAMINE SUCCINATE AND PYRIDOXINE HYDROCHLORIDE 10; 10 MG/1; MG/1
10-10 TABLET, DELAYED RELEASE ORAL
Qty: 60 | Refills: 0 | Status: ACTIVE | COMMUNITY
Start: 2024-01-22 | End: 1900-01-01

## 2024-01-22 RX ORDER — ASCORBIC ACID, CHOLECALCIFEROL, .ALPHA.-TOCOPHEROL ACETATE, DL-, PYRIDOXINE, FOLIC ACID, CYANOCOBALAMIN, CALCIUM, FERROUS FUMARATE, MAGNESIUM, DOCONEXENT 85; 200; 10; 25; 1; 12; 140; 27; 45; 300 [IU]/1; [IU]/1; [IU]/1; [IU]/1; MG/1; UG/1; MG/1; MG/1; MG/1; MG/1
27-0.6-0.4-3 CAPSULE, GELATIN COATED ORAL
Qty: 90 | Refills: 3 | Status: ACTIVE | COMMUNITY
Start: 2024-01-22 | End: 1900-01-01

## 2024-01-22 NOTE — PHYSICAL EXAM
[Chaperone Present] : A chaperone was present in the examining room during all aspects of the physical examination [Appropriately responsive] : appropriately responsive [Alert] : alert [No Acute Distress] : no acute distress [Soft] : soft [Non-distended] : non-distended [Non-tender] : non-tender [No Lesions] : no lesions [No HSM] : No HSM [Oriented x3] : oriented x3 [No Mass] : no mass [Examination Of The Breasts] : a normal appearance [No Masses] : no breast masses were palpable [Labia Minora] : normal [Labia Majora] : normal [Normal] : normal [Uterine Adnexae] : normal

## 2024-01-25 DIAGNOSIS — N39.0 URINARY TRACT INFECTION, SITE NOT SPECIFIED: ICD-10-CM

## 2024-01-26 LAB
ABO + RH PNL BLD: NORMAL
APPEARANCE: ABNORMAL
BACTERIA UR CULT: ABNORMAL
BACTERIA: ABNORMAL /HPF
BASOPHILS # BLD AUTO: 0.02 K/UL
BASOPHILS NFR BLD AUTO: 0.2 %
BILIRUBIN URINE: NEGATIVE
BLD GP AB SCN SERPL QL: NORMAL
BLOOD URINE: ABNORMAL
C TRACH RRNA SPEC QL NAA+PROBE: NOT DETECTED
CAST: 0 /LPF
COLOR: YELLOW
EOSINOPHIL # BLD AUTO: 0.05 K/UL
EOSINOPHIL NFR BLD AUTO: 0.4 %
EPITHELIAL CELLS: 12 /HPF
ESTIMATED AVERAGE GLUCOSE: 91 MG/DL
GLUCOSE QUALITATIVE U: NEGATIVE MG/DL
GLUCOSE SERPL-MCNC: 109 MG/DL
HBA1C MFR BLD HPLC: 4.8 %
HBV SURFACE AG SER QL: NONREACTIVE
HCT VFR BLD CALC: 37.8 %
HCV AB SER QL: NONREACTIVE
HCV S/CO RATIO: 0.11 S/CO
HGB BLD-MCNC: 12 G/DL
HIV1+2 AB SPEC QL IA.RAPID: NONREACTIVE
IMM GRANULOCYTES NFR BLD AUTO: 0.2 %
KETONES URINE: NEGATIVE MG/DL
LEAD BLD-MCNC: <1 UG/DL
LEUKOCYTE ESTERASE URINE: ABNORMAL
LYMPHOCYTES # BLD AUTO: 2.94 K/UL
LYMPHOCYTES NFR BLD AUTO: 23.9 %
M TB IFN-G BLD-IMP: NEGATIVE
MAN DIFF?: NORMAL
MCHC RBC-ENTMCNC: 29.2 PG
MCHC RBC-ENTMCNC: 31.7 GM/DL
MCV RBC AUTO: 92 FL
MEV IGG FLD QL IA: <5 AU/ML
MEV IGG+IGM SER-IMP: NEGATIVE
MICROSCOPIC-UA: NORMAL
MONOCYTES # BLD AUTO: 0.72 K/UL
MONOCYTES NFR BLD AUTO: 5.9 %
MUV AB SER-ACNC: POSITIVE
MUV IGG SER QL IA: 172 AU/ML
N GONORRHOEA RRNA SPEC QL NAA+PROBE: NOT DETECTED
NEUTROPHILS # BLD AUTO: 8.54 K/UL
NEUTROPHILS NFR BLD AUTO: 69.4 %
NITRITE URINE: POSITIVE
PH URINE: 5.5
PLATELET # BLD AUTO: 375 K/UL
PROTEIN URINE: NEGATIVE MG/DL
QUANTIFERON TB PLUS MITOGEN MINUS NIL: >10 IU/ML
QUANTIFERON TB PLUS NIL: 0.01 IU/ML
QUANTIFERON TB PLUS TB1 MINUS NIL: 0 IU/ML
QUANTIFERON TB PLUS TB2 MINUS NIL: 0 IU/ML
RBC # BLD: 4.11 M/UL
RBC # FLD: 12.5 %
RED BLOOD CELLS URINE: 4 /HPF
RUBV IGG FLD-ACNC: 1.6 INDEX
RUBV IGG SER-IMP: POSITIVE
SOURCE AMPLIFICATION: NORMAL
SPECIFIC GRAVITY URINE: >1.03
T PALLIDUM AB SER QL IA: NEGATIVE
UROBILINOGEN URINE: 0.2 MG/DL
WBC # FLD AUTO: 12.3 K/UL
WHITE BLOOD CELLS URINE: 3 /HPF

## 2024-01-26 RX ORDER — CEPHALEXIN 500 MG/1
500 TABLET ORAL EVERY 8 HOURS
Qty: 21 | Refills: 0 | Status: ACTIVE | COMMUNITY
Start: 2024-01-25 | End: 1900-01-01

## 2024-01-26 NOTE — REVIEW OF SYSTEMS
[Patient Intake Form Reviewed] : Patient intake form was reviewed [Negative] : Heme/Lymph [FreeTextEntry4] : congenital deafness

## 2024-01-26 NOTE — PLAN
[FreeTextEntry1] : 22 y/o female presenting with amenorrhea  -f/u PAP and GC/CT done today -f/u prenatal blood work drawn today -Rx sent for PNV  -RTO 2 weeks for first trimester ultrasound and review of prenatal lab results

## 2024-01-26 NOTE — HISTORY OF PRESENT ILLNESS
[FreeTextEntry1] : Patient is a 23 year old who presents after she had a positive home pregnancy test. LMP 11/25/23. She is endorsing occasional nausea and breast tenderness.

## 2024-02-05 LAB — CYTOLOGY CVX/VAG DOC THIN PREP: ABNORMAL

## 2024-02-07 ENCOUNTER — ASOB RESULT (OUTPATIENT)
Age: 24
End: 2024-02-07

## 2024-02-07 ENCOUNTER — APPOINTMENT (OUTPATIENT)
Dept: OBGYN | Facility: CLINIC | Age: 24
End: 2024-02-07
Payer: MEDICAID

## 2024-02-07 VITALS — WEIGHT: 178 LBS | DIASTOLIC BLOOD PRESSURE: 80 MMHG | SYSTOLIC BLOOD PRESSURE: 112 MMHG

## 2024-02-07 PROCEDURE — 99214 OFFICE O/P EST MOD 30 MIN: CPT

## 2024-02-07 NOTE — HISTORY OF PRESENT ILLNESS
[FreeTextEntry1] : 23 yo  female presents today for termination of pregnancy. She is unsure of when her lmp was but states that she believes it was between  and .

## 2024-02-07 NOTE — PLAN
[FreeTextEntry1] : 23 yo female for termination of pregnancy: -sono showing fetus measuring 10w3d; see official sono report -pt referred to Dr. Heart office for further management -f/u for colpo

## 2024-02-09 PROBLEM — Z33.2 LEGAL ABORTION: Status: ACTIVE | Noted: 2024-02-09

## 2024-02-09 PROBLEM — Z64.0 UNPLANNED UNWANTED PREGNANCY: Status: ACTIVE | Noted: 2023-08-15

## 2024-02-12 ENCOUNTER — APPOINTMENT (OUTPATIENT)
Dept: OBGYN | Facility: CLINIC | Age: 24
End: 2024-02-12
Payer: MEDICAID

## 2024-02-12 ENCOUNTER — OUTPATIENT (OUTPATIENT)
Dept: OUTPATIENT SERVICES | Facility: HOSPITAL | Age: 24
LOS: 1 days | End: 2024-02-12
Payer: COMMERCIAL

## 2024-02-12 ENCOUNTER — APPOINTMENT (OUTPATIENT)
Dept: OBGYN | Facility: CLINIC | Age: 24
End: 2024-02-12

## 2024-02-12 ENCOUNTER — TRANSCRIPTION ENCOUNTER (OUTPATIENT)
Age: 24
End: 2024-02-12

## 2024-02-12 ENCOUNTER — APPOINTMENT (OUTPATIENT)
Dept: ANTEPARTUM | Facility: CLINIC | Age: 24
End: 2024-02-12

## 2024-02-12 VITALS
WEIGHT: 172.62 LBS | DIASTOLIC BLOOD PRESSURE: 82 MMHG | SYSTOLIC BLOOD PRESSURE: 127 MMHG | OXYGEN SATURATION: 98 % | RESPIRATION RATE: 16 BRPM | TEMPERATURE: 98 F | HEART RATE: 94 BPM | HEIGHT: 61 IN

## 2024-02-12 VITALS
BODY MASS INDEX: 33.51 KG/M2 | DIASTOLIC BLOOD PRESSURE: 82 MMHG | HEIGHT: 61 IN | SYSTOLIC BLOOD PRESSURE: 114 MMHG | WEIGHT: 177.5 LBS

## 2024-02-12 DIAGNOSIS — R11.2 NAUSEA WITH VOMITING, UNSPECIFIED: ICD-10-CM

## 2024-02-12 DIAGNOSIS — Z33.2 ENCOUNTER FOR ELECTIVE TERMINATION OF PREGNANCY: ICD-10-CM

## 2024-02-12 DIAGNOSIS — H90.5 UNSPECIFIED SENSORINEURAL HEARING LOSS: ICD-10-CM

## 2024-02-12 DIAGNOSIS — Z98.891 HISTORY OF UTERINE SCAR FROM PREVIOUS SURGERY: Chronic | ICD-10-CM

## 2024-02-12 DIAGNOSIS — Z64.0 PROBLEMS RELATED TO UNWANTED PREGNANCY: ICD-10-CM

## 2024-02-12 DIAGNOSIS — Z34.90 ENCOUNTER FOR SUPERVISION OF NORMAL PREGNANCY, UNSPECIFIED, UNSPECIFIED TRIMESTER: ICD-10-CM

## 2024-02-12 DIAGNOSIS — Z01.818 ENCOUNTER FOR OTHER PREPROCEDURAL EXAMINATION: ICD-10-CM

## 2024-02-12 LAB
BLD GP AB SCN SERPL QL: NEGATIVE — SIGNIFICANT CHANGE UP
HCT VFR BLD CALC: 40.1 % — SIGNIFICANT CHANGE UP (ref 34.5–45)
HGB BLD-MCNC: 13.5 G/DL — SIGNIFICANT CHANGE UP (ref 11.5–15.5)
MCHC RBC-ENTMCNC: 29.7 PG — SIGNIFICANT CHANGE UP (ref 27–34)
MCHC RBC-ENTMCNC: 33.7 GM/DL — SIGNIFICANT CHANGE UP (ref 32–36)
MCV RBC AUTO: 88.3 FL — SIGNIFICANT CHANGE UP (ref 80–100)
NRBC # BLD: 0 /100 WBCS — SIGNIFICANT CHANGE UP (ref 0–0)
PLATELET # BLD AUTO: 399 K/UL — SIGNIFICANT CHANGE UP (ref 150–400)
RBC # BLD: 4.54 M/UL — SIGNIFICANT CHANGE UP (ref 3.8–5.2)
RBC # FLD: 12.2 % — SIGNIFICANT CHANGE UP (ref 10.3–14.5)
RH IG SCN BLD-IMP: POSITIVE — SIGNIFICANT CHANGE UP
WBC # BLD: 8.94 K/UL — SIGNIFICANT CHANGE UP (ref 3.8–10.5)
WBC # FLD AUTO: 8.94 K/UL — SIGNIFICANT CHANGE UP (ref 3.8–10.5)

## 2024-02-12 PROCEDURE — 85027 COMPLETE CBC AUTOMATED: CPT

## 2024-02-12 PROCEDURE — 76815 OB US LIMITED FETUS(S): CPT | Mod: 26

## 2024-02-12 PROCEDURE — 86901 BLOOD TYPING SEROLOGIC RH(D): CPT

## 2024-02-12 PROCEDURE — 86850 RBC ANTIBODY SCREEN: CPT

## 2024-02-12 PROCEDURE — G0463: CPT

## 2024-02-12 PROCEDURE — 99214 OFFICE O/P EST MOD 30 MIN: CPT

## 2024-02-12 PROCEDURE — 86900 BLOOD TYPING SEROLOGIC ABO: CPT

## 2024-02-12 PROCEDURE — 99204 OFFICE O/P NEW MOD 45 MIN: CPT

## 2024-02-12 PROCEDURE — 36415 COLL VENOUS BLD VENIPUNCTURE: CPT

## 2024-02-12 RX ORDER — NORETHINDRONE ACETATE AND ETHINYL ESTRADIOL AND FERROUS FUMARATE 1.5-30(21)
1.5-3 KIT ORAL
Qty: 1 | Refills: 1 | Status: ACTIVE | COMMUNITY
Start: 2024-02-12 | End: 1900-01-01

## 2024-02-12 RX ORDER — SODIUM CHLORIDE 9 MG/ML
1000 INJECTION, SOLUTION INTRAVENOUS
Refills: 0 | Status: DISCONTINUED | OUTPATIENT
Start: 2024-02-13 | End: 2024-02-27

## 2024-02-12 RX ORDER — ONDANSETRON 4 MG/1
4 TABLET ORAL EVERY 6 HOURS
Qty: 6 | Refills: 1 | Status: ACTIVE | COMMUNITY
Start: 2024-02-12 | End: 1900-01-01

## 2024-02-12 RX ORDER — LIDOCAINE HCL 20 MG/ML
0.2 VIAL (ML) INJECTION ONCE
Refills: 0 | Status: DISCONTINUED | OUTPATIENT
Start: 2024-02-13 | End: 2024-02-27

## 2024-02-12 NOTE — H&P PST ADULT - NSICDXPASTMEDICALHX_GEN_ALL_CORE_FT
PAST MEDICAL HISTORY:  Class 1 obesity with body mass index (BMI) of 32.0 to 32.9 in adult     Congenital deafness     Unplanned pregnancy

## 2024-02-12 NOTE — H&P PST ADULT - NSANTHOSAYNRD_GEN_A_CORE
No. ACLLUM screening performed.  STOP BANG Legend: 0-2 = LOW Risk; 3-4 = INTERMEDIATE Risk; 5-8 = HIGH Risk

## 2024-02-12 NOTE — H&P PST ADULT - BP NONINVASIVE DIASTOLIC (MM HG)
Attending attestation:   Patient seen and examined at approximately 11pm on 12/9, with parents at bedside.     I have reviewed the History, Physical Exam, Assessment and Plan as written by the above PGY-1. I have edited where appropriate.     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 Per family swelling was first noticed in that area in August of 2018. Did receive 2 courses of antibiotics (family unsure of name) in August and again in October with improvement in swelling though without complete resolution of the cyst. 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 Per mom since the MRI the swelling has progressively gotten worse. 2 days prior to admission the patient started to complain of pain and on day of admission developed redness around the area spreading to and into her left ear as well. No difficulty swallowing, no difficulty eating or drinking, no difficulty breathing.  No fevers at home. No vomiting, no diarrhea. Mom does admit to recent URI symptoms for about 1 week. No discharge from the area or the ear. Did admit to pain with jaw movement and with neck movement upwards which is now improved. Mom does admit to some decreased oral intake but has been urinating.    ED: Tmax of 100.5, the area was noted to be fluctuant, ENT consulted and saw her, they said they recommend IV antibiotics and pain control and will consider surgery if not improved . ED started IV clindamycin and transferred to the floor for further management.      T(C): 36.7 (12-09-18 @ 21:00), Max: 38.1 (12-09-18 @ 18:15)  HR: 94 (12-09-18 @ 21:00) (94 - 119)  BP: 110/72 (12-09-18 @ 21:00) (106/64 - 119/79)  RR: 20 (12-09-18 @ 21:00) (20 - 20)  SpO2: 100% (12-09-18 @ 21:00) (98% - 100%)  Gen: no apparent distress, appears comfortable  HEENT: normocephalic/atraumatic, moist mucous membranes, throat clear, pupils equal round and reactive, extraocular movements intact, clear conjunctiva, open to open her mouth without pain, left postauricular area with induration and slight fluctuance noted. induration about 2X 4 cm with surrounding erythema spreading anteriorly to the left ear auricle and into the medial left ear to the anterior of the ear, pain to palpation noted over the areas of induration, no drainage noted, some whitish coloration of the eyelashes noted  Neck: supple, full range of motion of the neck, shoddy lymph nodes  Heart: S1S2+, regular rate and rhythm, no murmur, cap refill < 2 sec, 2+ peripheral pulses  Lungs: normal respiratory pattern, clear to auscultation bilaterally  Abd: soft, nontender, nondistended, bowel sounds present, no hepatosplenomegaly  : deferred  Ext: full range of motion, no edema, no tenderness  Neuro: no focal deficits, awake, alert, no acute change from baseline exam  Skin: no rash, intact and not indurated    Labs noted:       Imaging noted:     A/P: Pt is a 4 year old female with left sided cystic structure questionable lymphatic malformation now presenting with possible infection of the area with worsening redness, swelling currently hemodynamically stable.    Post auricular infection  -continue IV clindamycin, monitor for improvement, area of erythema is marked  -follow up ENT recommendations  -tylenol, motrin PRN for fever or pain  -if not improving would consider additional imaging and sending cbc, crp,esr, follow up pending blood culture    FEN/Gi  -regular diet  -strict i's and os      I reviewed lab results and radiology. I spoke with consultants, and updated parent/guardian on plan of care.     Communication with Primary Care Physician  Date/Time: 12-09-18 @ 23:16  Current length of hospitalization:   [facility]ed[email or web address]  Type of Communication: [x ] Admission  [ ] Interim Update [ ] Discharge [ ] Other (specify):_______   Method of Contact: [x ] E-mail [ ] Phone [ ] [employer] Secure Communication [ ] Fax      Heide Albright DO  Pediatric Hospitalist  Ext [phone]
[phone]

## 2024-02-12 NOTE — H&P PST ADULT - PROBLEM SELECTOR PLAN 1
D&C w ultrasound   PST Instructions provided, patient verbalized understanding   CBC, T&S collected and sent

## 2024-02-12 NOTE — H&P PST ADULT - ASSESSMENT
Airway : no airway abnormalities , denies prior anesthesia complications   Mallampati : III  Denies loose teeth    Piotr abrasion risk : Denies

## 2024-02-12 NOTE — H&P PST ADULT - HISTORY OF PRESENT ILLNESS
24 yr old F , deaf using  Deepthi who is  assisting with communication ,  , , about 11 weeks of gestation , LMP 2023 presents to PST for scheduled D&C w /ultrasound on 2024. Reports intermittent nausea, no other complaints.  24 yr old F , deaf using  Deepthi who is  assisting with communication ,  , , about 11 weeks of gestation , LMP 2023 presents to PST for scheduled D&C w /ultrasound on 2024 for elective termination. Reports intermittent nausea, no other complaints.

## 2024-02-13 ENCOUNTER — OUTPATIENT (OUTPATIENT)
Dept: OUTPATIENT SERVICES | Facility: HOSPITAL | Age: 24
LOS: 1 days | End: 2024-02-13
Payer: COMMERCIAL

## 2024-02-13 ENCOUNTER — TRANSCRIPTION ENCOUNTER (OUTPATIENT)
Age: 24
End: 2024-02-13

## 2024-02-13 ENCOUNTER — APPOINTMENT (OUTPATIENT)
Dept: OBGYN | Facility: CLINIC | Age: 24
End: 2024-02-13

## 2024-02-13 ENCOUNTER — RESULT REVIEW (OUTPATIENT)
Age: 24
End: 2024-02-13

## 2024-02-13 VITALS
RESPIRATION RATE: 18 BRPM | HEIGHT: 61 IN | SYSTOLIC BLOOD PRESSURE: 110 MMHG | DIASTOLIC BLOOD PRESSURE: 79 MMHG | HEART RATE: 76 BPM | OXYGEN SATURATION: 99 % | TEMPERATURE: 98 F | WEIGHT: 172.62 LBS

## 2024-02-13 VITALS
HEART RATE: 87 BPM | RESPIRATION RATE: 16 BRPM | SYSTOLIC BLOOD PRESSURE: 114 MMHG | TEMPERATURE: 98 F | DIASTOLIC BLOOD PRESSURE: 75 MMHG | OXYGEN SATURATION: 99 %

## 2024-02-13 DIAGNOSIS — Z98.891 HISTORY OF UTERINE SCAR FROM PREVIOUS SURGERY: Chronic | ICD-10-CM

## 2024-02-13 DIAGNOSIS — Z33.2 ENCOUNTER FOR ELECTIVE TERMINATION OF PREGNANCY: ICD-10-CM

## 2024-02-13 PROCEDURE — 86901 BLOOD TYPING SEROLOGIC RH(D): CPT

## 2024-02-13 PROCEDURE — 76998 US GUIDE INTRAOP: CPT | Mod: 26

## 2024-02-13 PROCEDURE — 88304 TISSUE EXAM BY PATHOLOGIST: CPT

## 2024-02-13 PROCEDURE — 86850 RBC ANTIBODY SCREEN: CPT

## 2024-02-13 PROCEDURE — 86900 BLOOD TYPING SEROLOGIC ABO: CPT

## 2024-02-13 PROCEDURE — 59840 INDUCED ABORTION D&C: CPT

## 2024-02-13 PROCEDURE — 88304 TISSUE EXAM BY PATHOLOGIST: CPT | Mod: 26

## 2024-02-13 RX ORDER — ONDANSETRON 8 MG/1
1 TABLET, FILM COATED ORAL
Refills: 0 | DISCHARGE

## 2024-02-13 RX ORDER — FENTANYL CITRATE 50 UG/ML
50 INJECTION INTRAVENOUS
Refills: 0 | Status: DISCONTINUED | OUTPATIENT
Start: 2024-02-13 | End: 2024-02-13

## 2024-02-13 RX ORDER — ONDANSETRON 8 MG/1
4 TABLET, FILM COATED ORAL ONCE
Refills: 0 | Status: DISCONTINUED | OUTPATIENT
Start: 2024-02-13 | End: 2024-02-27

## 2024-02-13 NOTE — PRE-ANESTHESIA EVALUATION ADULT - NSANTHOSAYNRD_GEN_A_CORE
No. CALLUM screening performed.  STOP BANG Legend: 0-2 = LOW Risk; 3-4 = INTERMEDIATE Risk; 5-8 = HIGH Risk

## 2024-02-13 NOTE — ASU DISCHARGE PLAN (ADULT/PEDIATRIC) - NS MD DC FALL RISK RISK
For information on Fall & Injury Prevention, visit: [email or web address] OR  [email or web address] OR  https://www.cdc.gov/steadi/patient.html

## 2024-02-13 NOTE — BRIEF OPERATIVE NOTE - OPERATION/FINDINGS
Uterus was anteverted, approximately 11 weeks in size. A thin endometrial stripe was noted on ultrasound at the conclusion of the procedure. Products of conception were appropriate for gestational age. anteverted uterus, approximately 11 weeks size.  fetus and placenta appropriate for gestational age, all fetal parts accounted for.  BT: O+

## 2024-02-13 NOTE — PRE-ANESTHESIA EVALUATION ADULT - NSANTHPMHFT_GEN_ALL_CORE
24 yr old woman who is deaf using  Deepthi, who is  assisting with communication ,  , , about 11 weeks of gestation presents for D&C w /ultrasound for elective termination

## 2024-02-13 NOTE — BRIEF OPERATIVE NOTE - NSICDXBRIEFPROCEDURE_GEN_ALL_CORE_FT
PROCEDURES:  Exam under anesthesia, pelvic 13-Feb-2024 14:08:13  Digna Munguia  Dilation and curettage, uterus, with ultrasound guidance 13-Feb-2024 14:08:23  Digna Munguai   PROCEDURES:  Induction of  by dilation and curettage 2024 14:22:47 1. EUA  2 dilation and curettage under ultrasound guidance Shania Pennington

## 2024-02-13 NOTE — BRIEF OPERATIVE NOTE - NSICDXBRIEFPREOP_GEN_ALL_CORE_FT
PRE-OP DIAGNOSIS:  Unplanned pregnancy 13-Feb-2024 14:08:42  Digna Munguia   PRE-OP DIAGNOSIS:  Unplanned pregnancy 13-Feb-2024 14:08:42 1. IUP @ 11 4/7 weeks  2. unplanned, undesired pregnancy Digna Munguia

## 2024-02-13 NOTE — ASU PATIENT PROFILE, ADULT - ABILITY TO HEAR (WITH HEARING AID OR HEARING APPLIANCE IF NORMALLY USED):
Pt is forgetful, unsure if he came from rehab or home. Pt forgetful as to social support at this time. Per care coordination, pt was from private house living with friend. Ambulatory with walker.
Adequate: hears normal conversation without difficulty

## 2024-02-13 NOTE — ASU DISCHARGE PLAN (ADULT/PEDIATRIC) - CARE PROVIDER_API CALL
Shania Pennington  Obstetrics and Gynecology  [address]  [place] [address]  Phone: [phone]  Fax: [phone]  Follow Up Time:

## 2024-02-13 NOTE — ASU DISCHARGE PLAN (ADULT/PEDIATRIC) - ASU DC SPECIAL INSTRUCTIONSFT
After your surgery it is normal to experience:    •	Vaginal bleeding- can last 1-2 weeks and should not be heavier than a period. It may come and go and be red, brown or pink. Use pads, not tampons.  •	Cramping- Is common especially within the first 24 hours. This should be relieved by taking over the counter motrin, advil or Tylenol.  •	Vaginal soreness/irritation- can occur in the first few days after surgery because of the instruments that were used in the vagina. Soreness can be treated with ice pack and irritation can be taken care of with an emollient such as balmex or aquaphor that you can put directly on the irritated area.    Restrictions: For 10-14 days after the surgery you should avoid the following:    •	Tampons  •	Sex  •	Vigorous gym exercise  •	Swimming  pools and tub baths  •	Wait a day or two before going back to work    Anesthesia Precautions:  For the next 24 hours do not:   •	drive a car,  •	 operate power tools or machinery,  •	drink alcohol, beer, or wine,   •	make important personal or business decisions    Diet:   •	Resume Regular diet but Progress diet slowly     Physician Notification- Warning signs to look out for  •	Heavy Vaginal Bleeding   •	Shortness of breath or chest pain  •	Severe Abdominal Pain  •	Persistent nausea and vomiting  •	Pain not relieved by medications  •	Fever greater than 100.4®F  •	Inability to tolerate liquids or foods  •	Unable to urinate after 8 hours

## 2024-02-13 NOTE — ASU PATIENT PROFILE, ADULT - FALL HARM RISK - UNIVERSAL INTERVENTIONS
Bed in lowest position, wheels locked, appropriate side rails in place/Call bell, personal items and telephone in reach/Instruct patient to call for assistance before getting out of bed or chair/Non-slip footwear when patient is out of bed/[place] to call system/Physically safe environment - no spills, clutter or unnecessary equipment/Purposeful Proactive Rounding/Room/bathroom lighting operational, light cord in reach

## 2024-02-14 LAB
C TRACH RRNA SPEC QL NAA+PROBE: NOT DETECTED
N GONORRHOEA RRNA SPEC QL NAA+PROBE: NOT DETECTED
SOURCE AMPLIFICATION: NORMAL

## 2024-02-16 LAB — SURGICAL PATHOLOGY STUDY: SIGNIFICANT CHANGE UP

## 2024-02-28 ENCOUNTER — APPOINTMENT (OUTPATIENT)
Dept: OBGYN | Facility: CLINIC | Age: 24
End: 2024-02-28

## 2024-03-05 NOTE — H&P PST ADULT - NS PRO FEM  PAP SMEARS 3YRS
[de-identified] : zyrtec, flonase/astelin -RTC PRN  Yazan De La Garza MD [facility] Rhinology and Skull Base Surgery Department of Otolaryngology- Head and Neck Surgery Health system 
yes

## 2024-04-11 ENCOUNTER — APPOINTMENT (OUTPATIENT)
Dept: OBGYN | Facility: HOSPITAL | Age: 24
End: 2024-04-11

## 2024-06-10 ENCOUNTER — EMERGENCY (EMERGENCY)
Facility: HOSPITAL | Age: 24
LOS: 1 days | Discharge: HOME | End: 2024-06-10
Attending: EMERGENCY MEDICINE | Admitting: EMERGENCY MEDICINE
Payer: MEDICAID

## 2024-06-10 VITALS
HEART RATE: 98 BPM | SYSTOLIC BLOOD PRESSURE: 110 MMHG | WEIGHT: 179.9 LBS | OXYGEN SATURATION: 95 % | TEMPERATURE: 98 F | DIASTOLIC BLOOD PRESSURE: 84 MMHG | RESPIRATION RATE: 18 BRPM

## 2024-06-10 DIAGNOSIS — Z98.891 HISTORY OF UTERINE SCAR FROM PREVIOUS SURGERY: Chronic | ICD-10-CM

## 2024-06-10 LAB
APPEARANCE UR: ABNORMAL
BACTERIA # UR AUTO: ABNORMAL /HPF
BILIRUB UR-MCNC: NEGATIVE — SIGNIFICANT CHANGE UP
CAST: 1 /LPF — SIGNIFICANT CHANGE UP (ref 0–4)
COLOR SPEC: YELLOW — SIGNIFICANT CHANGE UP
DIFF PNL FLD: NEGATIVE — SIGNIFICANT CHANGE UP
EPI CELLS # UR: PRESENT
GLUCOSE UR QL: NEGATIVE MG/DL — SIGNIFICANT CHANGE UP
KETONES UR-MCNC: NEGATIVE MG/DL — SIGNIFICANT CHANGE UP
LEUKOCYTE ESTERASE UR-ACNC: ABNORMAL
Lab: PRESENT
MUCOUS THREADS # UR AUTO: PRESENT
NITRITE UR-MCNC: NEGATIVE — SIGNIFICANT CHANGE UP
PH UR: 5.5 — SIGNIFICANT CHANGE UP (ref 5–8)
PROT UR-MCNC: SIGNIFICANT CHANGE UP MG/DL
RBC CASTS # UR COMP ASSIST: 12 /HPF — HIGH (ref 0–4)
SP GR SPEC: 1.03 — SIGNIFICANT CHANGE UP (ref 1–1.03)
SQUAMOUS # UR AUTO: 32 /HPF — HIGH (ref 0–5)
UROBILINOGEN FLD QL: 0.2 MG/DL — SIGNIFICANT CHANGE UP (ref 0.2–1)
WBC UR QL: 20 /HPF — HIGH (ref 0–5)

## 2024-06-10 PROCEDURE — 76830 TRANSVAGINAL US NON-OB: CPT | Mod: 26

## 2024-06-10 PROCEDURE — 99284 EMERGENCY DEPT VISIT MOD MDM: CPT

## 2024-06-10 RX ORDER — CEPHALEXIN 500 MG
1 CAPSULE ORAL
Qty: 14 | Refills: 0
Start: 2024-06-10 | End: 2024-06-16

## 2024-06-10 RX ORDER — NYSTATIN CREAM 100000 [USP'U]/G
1 CREAM TOPICAL ONCE
Refills: 0 | Status: COMPLETED | OUTPATIENT
Start: 2024-06-10 | End: 2024-06-10

## 2024-06-10 RX ADMIN — NYSTATIN CREAM 1 APPLICATION(S): 100000 CREAM TOPICAL at 20:50

## 2024-06-10 NOTE — ED PROVIDER NOTE - PATIENT PORTAL LINK FT
You can access the FollowMyHealth Patient Portal offered by [facility] by registering at the following website: [email or web address]. By joining [email or web address] FollowMyHealth portal, you will also be able to view your health information using other applications (apps) compatible with our system.

## 2024-06-10 NOTE — ED PROVIDER NOTE - CLINICAL SUMMARY MEDICAL DECISION MAKING FREE TEXT BOX
Patient resents emergency department with left-sided breast fungal type infection with mild left sourrounded hypopgmentation. with pruritus. with mild left adnexal ttp, no guarding. with lmp 5 weeks ago. hd stable otherwise. pending US, UA, and tx for fungal.

## 2024-06-10 NOTE — ED PROVIDER NOTE - OBJECTIVE STATEMENT
24-year-old G2, P1 with  history presents emergency department with transient episode of hematuria that was yesterday morning with normalization today.  Patient also admits to fractionate the left breast started over the last 2 to 3 days with pruritus.  Patient denies any other systemic symptoms.  Mild suprapubic cramping and left adnexal tenderness.  Otherwise well-appearing states the pain is proxy 1 out of 10.  Via  for American sign language.  No fever no chills no cough no active dysuria at this point.  States after her  1 year ago was diagnosed with a possible bacterial vaginosis versus urinary tract infection and treated possibly with metronidazole because patient states it was an every 8 hour medicine.But does not know exactly which disease she had in which treatment she was provided.

## 2024-06-10 NOTE — ED PROVIDER NOTE - NSFOLLOWUPINSTRUCTIONS_ED_ALL_ED_FT
Urinary Tract Infection, Adult       A urinary tract infection (UTI) is an infection of any part of the urinary tract. The urinary tract includes the kidneys, ureters, bladder, and urethra. These organs make, store, and get rid of urine in the body.    Your health care provider may use other names to describe the infection. An upper UTI affects the ureters and kidneys (pyelonephritis). A lower UTI affects the bladder (cystitis) and urethra (urethritis).    What are the causes?  Most urinary tract infections are caused by bacteria in your genital area, around the entrance to your urinary tract (urethra). These bacteria grow and cause inflammation of your urinary tract.    What increases the risk?  You are more likely to develop this condition if:    You have a urinary catheter that stays in place (indwelling).  You are not able to control when you urinate or have a bowel movement (you have incontinence).  You are female and you:    Use a spermicide or diaphragm for birth control.  Have low estrogen levels.  Are pregnant.  You have certain genes that increase your risk (genetics).  You are sexually active.  You take antibiotic medicines.  You have a condition that causes your flow of urine to slow down, such as:    An enlarged prostate, if you are male.  Blockage in your urethra (stricture).  A kidney stone.  A nerve condition that affects your bladder control (neurogenic bladder).  Not getting enough to drink, or not urinating often.  You have certain medical conditions, such as:    Diabetes.  A weak disease-fighting system (immunesystem).  Sickle cell disease.  Gout.  Spinal cord injury.    What are the signs or symptoms?  Symptoms of this condition include:    Needing to urinate right away (urgently).  Frequent urination or passing small amounts of urine frequently.  Pain or burning with urination.  Blood in the urine.  Urine that smells bad or unusual.  Trouble urinating.  Cloudy urine.  Vaginal discharge, if you are female.  Pain in the abdomen or the lower back.    You may also have:    Vomiting or a decreased appetite.  Confusion.  Irritability or tiredness.  A fever.  Diarrhea.    The first symptom in older adults may be confusion. In some cases, they may not have any symptoms until the infection has worsened.    How is this diagnosed?  This condition is diagnosed based on your medical history and a physical exam. You may also have other tests, including:    Urine tests.  Blood tests.  Tests for sexually transmitted infections (STIs).    If you have had more than one UTI, a cystoscopy or imaging studies may be done to determine the cause of the infections.    How is this treated?  Treatment for this condition includes:    Antibiotic medicine.  Over-the-counter medicines to treat discomfort.  Drinking enough water to stay hydrated.    If you have frequent infections or have other conditions such as a kidney stone, you may need to see a health care provider who specializes in the urinary tract (urologist).    In rare cases, urinary tract infections can cause sepsis. Sepsis is a life-threatening condition that occurs when the body responds to an infection. Sepsis is treated in the hospital with IV antibiotics, fluids, and other medicines.    Follow these instructions at home:         Medicines    Take over-the-counter and prescription medicines only as told by your health care provider.  If you were prescribed an antibiotic medicine, take it as told by your health care provider. Do not stop using the antibiotic even if you start to feel better.        General instructions    Make sure you:    Empty your bladder often and completely. Do not hold urine for long periods of time.  Empty your bladder after sex.  Wipe from front to back after a bowel movement if you are female. Use each tissue one time when you wipe.  Drink enough fluid to keep your urine pale yellow.  Keep all follow-up visits as told by your health care provider. This is important.    Contact a health care provider if:  Your symptoms do not get better after 1–2 days.  Your symptoms go away and then return.    Get help right away if you have:  Severe pain in your back or your lower abdomen.  A fever.  Nausea or vomiting.    Summary  A urinary tract infection (UTI) is an infection of any part of the urinary tract, which includes the kidneys, ureters, bladder, and urethra.  Most urinary tract infections are caused by bacteria in your genital area, around the entrance to your urinary tract (urethra).  Treatment for this condition often includes antibiotic medicines.  If you were prescribed an antibiotic medicine, take it as told by your health care provider. Do not stop using the antibiotic even if you start to feel better.  Keep all follow-up visits as told by your health care provider. This is important.    ADDITIONAL NOTES AND INSTRUCTIONS    Please follow up with your Primary MD in 24-48 hr.  Seek immediate medical care for any new/worsening signs or symptoms.

## 2024-06-10 NOTE — ED ADULT TRIAGE NOTE - GLASGOW COMA SCALE: EYE OPENING, MLM
[Diabetes Foot Care] : diabetes foot care [Long Term Vascular Complications] : long term vascular complications of diabetes [Carbohydrate Consistent Diet] : carbohydrate consistent diet [Hypoglycemia Management] : hypoglycemia management (E4) spontaneous [Self Monitoring of Blood Glucose] : self monitoring of blood glucose [Retinopathy Screening] : Patient was referred to ophthalmology for retinopathy screening [FreeTextEntry1] : 1. Posttransplant DM, h/o underlying CKD\par h/o DDRT in 2019, following regularly with nephrology and transplant center\par on immunosuppressive therapy including prednisone 5mg daily. \par Recent HgbA1c is 5.6%, at goal.  Patient advised no need for tight control due to advanced age and risk of hypoglycemia.  Patient is well aware of signs and symptoms of hypoglycemia and how to manage such episodes.\par Continue Lantus 6U qhs for now.   \par Continue Tradjenta 5mg daily\par Continue Prandin 1mg pre breakfast, 2mg with lunch and 2mg with dinner\par Following with nephrology for CKD. Not on statin, on Vascepa.\par No foot ulcers noted today, saw ophthalmology, last month, stable findings, no h/o retinopathy, h/o cataract surgery in 2019\par \par 2. h/o thyroid nodule \par h/o FNA in 2017, patient reportedly noted benign pathology, no records to review today\par Biochemically and clinically euthyroid, not on any thyroid medication\par Reviewed thyroid sonogram from March 2023: noted stable 4.7cm left lower pole nodule\par Reminded pt to repeat thyroid sonogram in 1 year, following w/ head and neck surgery for this as well\par \par Answered all questions today; patient and her daughter verbalized understanding of the above\par RTC in 6 months

## 2024-06-10 NOTE — ED ADULT NURSE NOTE - OBJECTIVE STATEMENT
Pt received to intake room 7. Pt presents to ED c/o rash under left breast and episode of hematuria yesterday. Pt is A&Ox4, ambulatory without assistance. neuro/sensory intact. breathing is even and unlabored. abdomen is soft, nontender, nondistended. no edema noted. skin is intact. spontaneous movement of all extremities. awaiting ultrasound. stretcher set in lowest position, call bell within reach, safety maintained.

## 2024-06-10 NOTE — ED ADULT NURSE NOTE - NSFALLUNIVINTERV_ED_ALL_ED
Bed/Stretcher in lowest position, wheels locked, appropriate side rails in place/Call bell, personal items and telephone in reach/Instruct patient to call for assistance before getting out of bed/chair/stretcher/Non-slip footwear applied when patient is off stretcher/[place] to call system/Physically safe environment - no spills, clutter or unnecessary equipment/Purposeful proactive rounding/Room/bathroom lighting operational, light cord in reach

## 2024-06-10 NOTE — ED PROVIDER NOTE - PROGRESS NOTE DETAILS
REBECA:  Patient signed out to me by Dr. Calvillo at 1600 pending UA and transvaginal ultrasound.  Transvaginal ultrasound with no acute findings.  UA grossly positive.  Will treat with antibiotics and discharge.  Return precautions given.  Discharge instructions given using  [id number].

## 2024-06-10 NOTE — ED PROVIDER NOTE - SKIN, MLM
Skin normal color for race, warm, dry and intact. fungal ring worm like infection left under breast.

## 2024-06-10 NOTE — ED ADULT NURSE NOTE - NS ED NURSE RECORD ANOTHER HT AND WT
Refilled adderall XR at 20 mg  daily with 10 mg immediate release in the afternoon.  Lab today.  I will notify you of results. If these are normal, referral placed to urology for ED consult.  I forgot your influenza vaccination but you can make an appointment here at the clinic with nursing at your convenience to get this completed.  
Yes

## 2024-06-15 PROBLEM — E66.9 OBESITY, UNSPECIFIED: Chronic | Status: ACTIVE | Noted: 2024-02-12

## 2024-06-15 PROBLEM — Z34.90 ENCOUNTER FOR SUPERVISION OF NORMAL PREGNANCY, UNSPECIFIED, UNSPECIFIED TRIMESTER: Chronic | Status: ACTIVE | Noted: 2024-02-12

## 2024-08-05 ENCOUNTER — APPOINTMENT (OUTPATIENT)
Dept: OBGYN | Facility: CLINIC | Age: 24
End: 2024-08-05

## 2024-08-19 ENCOUNTER — APPOINTMENT (OUTPATIENT)
Dept: OBGYN | Facility: CLINIC | Age: 24
End: 2024-08-19
Payer: MEDICAID

## 2024-08-19 VITALS
DIASTOLIC BLOOD PRESSURE: 74 MMHG | HEIGHT: 61 IN | WEIGHT: 182 LBS | BODY MASS INDEX: 34.36 KG/M2 | SYSTOLIC BLOOD PRESSURE: 107 MMHG

## 2024-08-19 DIAGNOSIS — R87.629 UNSPECIFIED ABNORMAL CYTOLOGICAL FINDINGS IN SPECIMENS FROM VAGINA: ICD-10-CM

## 2024-08-19 PROCEDURE — 36415 COLL VENOUS BLD VENIPUNCTURE: CPT

## 2024-08-19 PROCEDURE — 99395 PREV VISIT EST AGE 18-39: CPT

## 2024-08-19 PROCEDURE — 99459 PELVIC EXAMINATION: CPT

## 2024-08-19 RX ORDER — ASCORBIC ACID, CHOLECALCIFEROL, .ALPHA.-TOCOPHEROL ACETATE, DL-, PYRIDOXINE, FOLIC ACID, CYANOCOBALAMIN, CALCIUM, FERROUS FUMARATE, MAGNESIUM, DOCONEXENT 85; 200; 10; 25; 1; 12; 140; 27; 45; 300 [IU]/1; [IU]/1; [IU]/1; [IU]/1; MG/1; UG/1; MG/1; MG/1; MG/1; MG/1
27-0.6-0.4-3 CAPSULE, GELATIN COATED ORAL
Qty: 3 | Refills: 2 | Status: ACTIVE | COMMUNITY
Start: 2024-08-19 | End: 1900-01-01

## 2024-08-19 NOTE — PLAN
[FreeTextEntry1] : 25 y/o P1 at 9w4d by LMP presenting with amenorrhea -f/u PAP  and GC/CT done today  -f/u prenatal blood work drawn today -Rx sent for PNV -recommended colpo at 16 weeks GA  -f/u 1 week for confirmation of pregnancy and NIPT

## 2024-08-19 NOTE — HISTORY OF PRESENT ILLNESS
[Y] : Positive pregnancy history [LMPDate] : 06/13/2024 [PGHxTotal] : 4 [facility] : 1 [Banneriving] : 1 [FreeTextEntry1] : C/S x1 d/t breech presentation  [PGHxABInduced] : 2 [Currently Active] : currently active [Men] : men [Vaginal] : vaginal [No] : No

## 2024-08-19 NOTE — PHYSICAL EXAM
[Chaperone Present] : A chaperone was present in the examining room during all aspects of the physical examination [10817] : A chaperone was present during the pelvic exam. [Appropriately responsive] : appropriately responsive [Alert] : alert [No Acute Distress] : no acute distress [No Lymphadenopathy] : no lymphadenopathy [Soft] : soft [Non-tender] : non-tender [Non-distended] : non-distended [No HSM] : No HSM [No Lesions] : no lesions [No Mass] : no mass [Oriented x3] : oriented x3 [Examination Of The Breasts] : a normal appearance [No Masses] : no breast masses were palpable [Labia Majora] : normal [Labia Minora] : normal [Normal] : normal [Uterine Adnexae] : normal

## 2024-08-22 DIAGNOSIS — N39.0 URINARY TRACT INFECTION, SITE NOT SPECIFIED: ICD-10-CM

## 2024-08-22 DIAGNOSIS — E55.9 VITAMIN D DEFICIENCY, UNSPECIFIED: ICD-10-CM

## 2024-08-22 LAB
25(OH)D3 SERPL-MCNC: 16.2 NG/ML
ABO + RH PNL BLD: NORMAL
APPEARANCE: CLEAR
BACTERIA UR CULT: ABNORMAL
BACTERIA: ABNORMAL /HPF
BASOPHILS # BLD AUTO: 0.03 K/UL
BASOPHILS NFR BLD AUTO: 0.3 %
BILIRUBIN URINE: NEGATIVE
BLD GP AB SCN SERPL QL: NORMAL
BLOOD URINE: NEGATIVE
C TRACH RRNA SPEC QL NAA+PROBE: NOT DETECTED
CAST: 3 /LPF
COLOR: NORMAL
EOSINOPHIL # BLD AUTO: 0.12 K/UL
EOSINOPHIL NFR BLD AUTO: 1 %
EPITHELIAL CELLS: 3 /HPF
ESTIMATED AVERAGE GLUCOSE: 91 MG/DL
GLUCOSE QUALITATIVE U: NEGATIVE MG/DL
GLUCOSE SERPL-MCNC: 76 MG/DL
HAV IGM SER QL: NONREACTIVE
HBA1C MFR BLD HPLC: 4.8 %
HBV CORE IGM SER QL: NONREACTIVE
HBV SURFACE AG SER QL: NONREACTIVE
HBV SURFACE AG SER QL: NONREACTIVE
HCT VFR BLD CALC: 40.7 %
HCV AB SER QL: NONREACTIVE
HCV S/CO RATIO: 0.11 S/CO
HGB BLD-MCNC: 12.6 G/DL
HIV1+2 AB SPEC QL IA.RAPID: NONREACTIVE
HPV 16 E6+E7 MRNA CVX QL NAA+PROBE: NOT DETECTED
HPV18+45 E6+E7 MRNA CVX QL NAA+PROBE: NOT DETECTED
IMM GRANULOCYTES NFR BLD AUTO: 0.3 %
KETONES URINE: ABNORMAL MG/DL
LEAD BLD-MCNC: <1 UG/DL
LEUKOCYTE ESTERASE URINE: NEGATIVE
LYMPHOCYTES # BLD AUTO: 2.99 K/UL
LYMPHOCYTES NFR BLD AUTO: 25 %
M TB IFN-G BLD-IMP: NEGATIVE
MAN DIFF?: NORMAL
MCHC RBC-ENTMCNC: 29.5 PG
MCHC RBC-ENTMCNC: 31 GM/DL
MCV RBC AUTO: 95.3 FL
MEV IGG FLD QL IA: <5 AU/ML
MEV IGG+IGM SER-IMP: NEGATIVE
MICROSCOPIC-UA: NORMAL
MONOCYTES # BLD AUTO: 0.88 K/UL
MONOCYTES NFR BLD AUTO: 7.3 %
MUV AB SER-ACNC: POSITIVE
MUV IGG SER QL IA: 152 AU/ML
N GONORRHOEA RRNA SPEC QL NAA+PROBE: NOT DETECTED
NEUTROPHILS # BLD AUTO: 7.92 K/UL
NEUTROPHILS NFR BLD AUTO: 66.1 %
NITRITE URINE: POSITIVE
PH URINE: 5.5
PLATELET # BLD AUTO: 358 K/UL
PROTEIN URINE: NORMAL MG/DL
QUANTIFERON TB PLUS MITOGEN MINUS NIL: >10 IU/ML
QUANTIFERON TB PLUS NIL: 0.04 IU/ML
QUANTIFERON TB PLUS TB1 MINUS NIL: 0 IU/ML
QUANTIFERON TB PLUS TB2 MINUS NIL: 0 IU/ML
RBC # BLD: 4.27 M/UL
RBC # FLD: 12.9 %
RED BLOOD CELLS URINE: 2 /HPF
RUBV IGG FLD-ACNC: 1.16 INDEX
RUBV IGG SER-IMP: POSITIVE
SOURCE AMPLIFICATION: NORMAL
SPECIFIC GRAVITY URINE: >1.03
T PALLIDUM AB SER QL IA: NEGATIVE
UROBILINOGEN URINE: 0.2 MG/DL
WBC # FLD AUTO: 11.98 K/UL
WHITE BLOOD CELLS URINE: 0 /HPF

## 2024-08-22 RX ORDER — CEPHALEXIN 500 MG/1
500 CAPSULE ORAL EVERY 8 HOURS
Qty: 21 | Refills: 0 | Status: ACTIVE | COMMUNITY
Start: 2024-08-22 | End: 1900-01-01

## 2024-08-22 RX ORDER — MULTIVIT-MIN/IRON/FOLIC ACID/K 18-600-40
50 MCG CAPSULE ORAL
Qty: 30 | Refills: 5 | Status: ACTIVE | COMMUNITY
Start: 2024-08-22 | End: 1900-01-01

## 2024-08-22 RX ORDER — ERGOCALCIFEROL 1.25 MG/1
1.25 MG CAPSULE, LIQUID FILLED ORAL
Qty: 4 | Refills: 0 | Status: ACTIVE | COMMUNITY
Start: 2024-08-22 | End: 1900-01-01

## 2024-08-25 LAB — HPV HIGH+LOW RISK DNA PNL CVX: DETECTED

## 2024-08-26 LAB — CYTOLOGY CVX/VAG DOC THIN PREP: ABNORMAL

## 2024-08-28 ENCOUNTER — APPOINTMENT (OUTPATIENT)
Dept: OBGYN | Facility: CLINIC | Age: 24
End: 2024-08-28

## 2024-08-28 ENCOUNTER — APPOINTMENT (OUTPATIENT)
Dept: ANTEPARTUM | Facility: CLINIC | Age: 24
End: 2024-08-28

## 2024-08-29 ENCOUNTER — NON-APPOINTMENT (OUTPATIENT)
Age: 24
End: 2024-08-29

## 2024-09-01 LAB
3-BETA-HYDROXYSTEROID DEHYDROGENASE II: NEGATIVE
3-HYDROXY-3-METHYLGLUTARYL-COA LYASE DEF: NEGATIVE
3-METHYLCROTONYL-COA CARBOXYLASE 1 DEFIC: NEGATIVE
3-METHYLCROTONYL-COA CARBOXYLASE 2 DEFIC: NEGATIVE
3-PHOSPHOGLYCERATE DEHYDROGENASE DEFICIE: NEGATIVE
6-PYRUVOYL-TETRAHYDROPTERIN SYNTHASE: NEGATIVE
ABETALIPOPROTEINEMIA: NEGATIVE
ACHONDROGENESIS, TYPE 1B: NEGATIVE
ACHROMATOPSIA: NEGATIVE
ACRODERMATITIS ENTEROPATHICA: NEGATIVE
ACUTE INFANTILE LIVER FAILURE: NEGATIVE
ACYL-COA OXIDASE I DEFICIENCY: NEGATIVE
ADRENOLEUKODYSTROPHY: NEGATIVE
AICARDI-GOUTIÈRES SYNDROME: NEGATIVE
ALPHA THALASSEMIA MENTAL RETARDATION SYN: NEGATIVE
ALPHA-MANNOSIDOSIS: NEGATIVE
ALPHA-THALASSEMIA: NEGATIVE
ALPORT SYNDROME, COL4A3-RELATED: NEGATIVE
ALPORT SYNDROME, COL4A4-RELATED: NEGATIVE
ALPORT SYNDROME, X-LINKED: NEGATIVE
ALSTROM SYNDROME: NEGATIVE
ANDERMANN SYNDROME: NEGATIVE
ARGININOSUCCINATE LYASE DEFICIENCY: NEGATIVE
AROMATASE DEFICIENCY: NEGATIVE
ASPARAGINE SYNTHETASE DEFICIENCY: NEGATIVE
ASPARTYLGLYCOSAMINURIA: NEGATIVE
ATAXIA WITH VITAMIN E DEFICIENCY: POSITIVE
ATAXIA-TELANGIECTASIA: NEGATIVE
AUTISM SPECTRUM, EPILEPSY AND ARTHROGRYP: NEGATIVE
AUTOSOMAL RECESSIVE SPASTIC ATAXIA OF CH: NEGATIVE
BARDET-BIEDL SYNDROME, BBS1-RELATED: NEGATIVE
BARDET-BIEDL SYNDROME, BBS10-RELATED: NEGATIVE
BARDET-BIEDL SYNDROME, BBS12-RELATED: NEGATIVE
BARDET-BIEDL SYNDROME, BBS2-RELATED: NEGATIVE
BARE LYMPHOCYTE SYNDROME, TYPE II: NEGATIVE
BARTTER SYNDROME: NEGATIVE
BATTEN DISEASE: NEGATIVE
BETA-HEMOGLOBINOPATHIES: NEGATIVE
BILATERAL FRONTOPARIETAL POLYMICROGYRIA: NEGATIVE
BIOTINIDASE DEFICIENCY: NEGATIVE
BLOOM SYNDROME: NEGATIVE
CANAVAN DISEASE: NEGATIVE
CARBAMOYL PHOSPHATE SYNTHETASE I DEF: NEGATIVE
CARNITINE DEFICIENCY: NEGATIVE
CARNITINE PALMITOYLTRANSFERASE IA DEF: NEGATIVE
CARNITINE PALMITOYLTRANSFERASE II DEF: NEGATIVE
CARPENTER SYNDROME: NEGATIVE
CARTILAGE-HAIR HYPOPLASIA: NEGATIVE
CEREBROTENDINOUS XANTHOMATOSIS: NEGATIVE
CFTR MUT ANL BLD/T: NEGATIVE
CHARCOT-MARIE-TOOTH DISEASE WITH DEAFNES: NEGATIVE
CHARCOT-MARIE-TOOTH DISEASE, TYPE 4D: POSITIVE
CHOREOACANTHOCYTOSIS: NEGATIVE
CHOROIDEREMIA: NEGATIVE
CHRONIC GRANULOMATOUS DISEASE, CYTOCHROM: NEGATIVE
CHRONIC GRANULOMATOUS DISEASE, X-LINKED: NEGATIVE
CILIOPATHIES, RPGRIP1L-RELATED: NEGATIVE
CITRIN DEFICIENCY: NEGATIVE
CITRULLINEMIA, TYPE I: NEGATIVE
COHEN SYNDROME: NEGATIVE
COMBINED MALONIC AND METHYLMALONIC ACIDU: NEGATIVE
COMBINED OXIDATIVE PHOSPHORYLATION DEF 3: NEGATIVE
COMBINED OXIDATIVE PHOSPHORYLATION DEF 4: NEGATIVE
COMBINED PITUITARY HORMONE DEFICIENCY-2: NEGATIVE
CONGENITAL ADRENAL HYPERPLASIA, 17-ALPHA: NEGATIVE
CONGENITAL AMEGAKARYOCYTIC THROMBOCYTOPE: NEGATIVE
CONGENITAL DISORDER OF GLYCOSYLATION 1C: NEGATIVE
CONGENITAL DISORDER OF GLYCOSYLATION IA: NEGATIVE
CONGENITAL DISORDER OF GLYCOSYLATION IB: NEGATIVE
CONGENITAL FINNISH NEPHROSIS: NEGATIVE
CONGENITAL INSENSIVITY TO PAIN WITH ANHI: NEGATIVE
CONGENITAL MYASTHENIC SYNDROME, CHRNE: NEGATIVE
CONGENITAL MYASTHENIC SYNDROME, RAPSN-RE: NEGATIVE
CONGENITAL NEUTROPENIA, HAX1-RELATED: NEGATIVE
CONGENITAL NEUTROPENIA, VPS45-RELATED: NEGATIVE
CORNEAL DYSTROPHY AND PERCEPTIVE DEAFNES: NEGATIVE
CORTICOSTERONE METHYLOXIDASE DEFICIENCY: NEGATIVE
COSTEFF DISEASE OPTIC ATROPHY: NEGATIVE
CRB1-RELATED RETINAL DYSTROPHIES: NEGATIVE
CREATINE TRANSPORTER DEFECT: NEGATIVE
CYSTINOSIS: NEGATIVE
D-BIFUNCTIONAL PROTEIN DEFICIENCY: NEGATIVE
DEAFNESS, AUTOSOMAL RECESSIVE 77: NEGATIVE
DMD GENE MUT ANL BLD/T: NEGATIVE
DYSKERATOSIS CONGENITA: NEGATIVE
DYSTROPHIC EPIDERMOLYSIS BULLOSA: NEGATIVE
EHLERS-DANLOS SYNDROME, TYPE VIIC: NEGATIVE
ELLIS-VAN CREVELD SYNDOME: NEGATIVE
EMERY-DREIFUSS MUSCULAR DYSTROPHY 1 X-LI: NEGATIVE
ENHANCED S-CONE SYNDROME: NEGATIVE
ETHYLMALONIC ENCEPHALOPATHY: NEGATIVE
FABRY DISEASE: NEGATIVE
FACTOR IX DEFICIENCY: NEGATIVE
FACTOR XI DEFICIENCY: NEGATIVE
FAMILIAL DYSAUTONOMIA: NEGATIVE
FAMILIAL HYPERCHOLESTEROLEMIA, LDLR: NEGATIVE
FAMILIAL HYPERCHOLESTEROLEMIA, LDLRAP1: NEGATIVE
FAMILIAL HYPERINSULINISM: NEGATIVE
FAMILIAL MEDITERRANEAN FEVER: NEGATIVE
FAMILIAL NEUROPOPHYSEAL DIABETES INSIPID: NEGATIVE
FANCONI ANEMIA, GROUP A: NEGATIVE
FANCONI ANEMIA, GROUP C: NEGATIVE
FANCONI ANEMIA, GROUP G: NEGATIVE
FRAGILE X PROTEIN (FMRP) BLD-IMP: NEGATIVE
FUMARASE DEFICIENCY: NEGATIVE
GALACTOKINASE DEFICIENCY: NEGATIVE
GALACTOSEMIA: NEGATIVE
GAUCHER DISEASE: NEGATIVE
GITELMAN SYNDROME: NEGATIVE
GLUTARIC ACIDEMIA, TYPE 2A: NEGATIVE
GLUTARIC ACIDEMIA, TYPE 2C: NEGATIVE
GLUTARYL-COA DEHYDROGENASE DEFICIENCY: NEGATIVE
GLYCINE ENCEPHALOPATHY, AMT-RELATED: NEGATIVE
GLYCINE ENCEPHALOPATHY: NEGATIVE
GLYCOGEN STORAGE DISEASE, TYPE 1A: NEGATIVE
GLYCOGEN STORAGE DISEASE, TYPE 2: NEGATIVE
GLYCOGEN STORAGE DISEASE, TYPE 3: NEGATIVE
GLYCOGEN STORAGE DISEASE, TYPE 4: NEGATIVE
GLYCOGEN STORAGE DISEASE, TYPE 5: NEGATIVE
GLYCOGEN STORAGE DISEASE, TYPE IB: NEGATIVE
GLYCOGEN STORAGE DISEASE, TYPE VII: NEGATIVE
GRACILE SYNDROME: NEGATIVE
GUANIDINOACETATE METHYLTRANSFERASE DEFIC: NEGATIVE
HEMOCHROMATOSIS, TYPE 2A: NEGATIVE
HEMOCHROMATOSIS, TYPE 3, TFR2-RELATED: NEGATIVE
HEREDITARY FRUCTOSE INTOLERANCE: NEGATIVE
HEREDITARY SPASTIC PARAPARESIS, TYPE 49: NEGATIVE
HERMANSKY-PUDLAK SYNDROME, HPS1-RELATED: NEGATIVE
HERMANSKY-PUDLAK SYNDROME, HPS3-RELATED: NEGATIVE
HEXOSAMINIDASE A & B BLD-IMP: NEGATIVE
HOLOCARBOXYLASE SYNTHETASE DEFICIENCY: NEGATIVE
HOMOCYSTINURIA DUE TO DEFIC OF MTHFR: NEGATIVE
HOMOCYSTINURIA, TYPE CBLE: NEGATIVE
HOMOCYSTINURIA: NEGATIVE
HYDROLETHALUS SYNDROME: NEGATIVE
HYPERINSULINEMIC HYPOGLYCEMIA: NEGATIVE
HYPERORNITHINEMIA-HYPERAMMONEMIA-HOMOCIT: NEGATIVE
HYPOHIDROTIC ECTODERMAL DYSPLASIA X-LINK: NEGATIVE
HYPOPHOSPHATASIA, AUTOSOMAL RECESSIVE: NEGATIVE
INCLUSION BODY MYOPATHY 2: NEGATIVE
INFANTILE CEREBRAL AND CEREBELLAR ATROPH: NEGATIVE
ISOVALERIC ACIDEMIA: NEGATIVE
JOUBERT SYNDROME 2: NEGATIVE
KETOTHIOLASE DEFICIENCY: NEGATIVE
KRABBE DISEASE: NEGATIVE
LAMELLAR ICHTHYOSIS, TYPE 1: NEGATIVE
LEBER CONGENITAL AMAUROSIS 2: NEGATIVE
LEBER CONGENITAL AMAUROSIS, TYPE CEP290: NEGATIVE
LEBER CONGENITAL AMAUROSIS, TYPE LCA5: NEGATIVE
LEBER CONGENITAL AMAUROSIS, TYPE RDH12: NEGATIVE
LEIGH SYNDROME, FRENCH-CANADIAN TYPE: NEGATIVE
LETHAL CONGENITAL CONTRACTURE SYNDROME 1: NEGATIVE
LEUKOENCEPHALOPATHY WITH VANISHING WHITE: NEGATIVE
LIMB GIRDLE MUSCULAR DYSTROPHY, TYPE 2A: NEGATIVE
LIMB GIRDLE MUSCULAR DYSTROPHY, TYPE 2B: NEGATIVE
LIMB GIRDLE MUSCULAR DYSTROPHY, TYPE 2I: NEGATIVE
LIMB-GIRDLE MUSCULAR DYSTROPHY, TYPE 2C: NEGATIVE
LIMB-GIRDLE MUSCULAR DYSTROPHY, TYPE 2D: NEGATIVE
LIMB-GIRDLE MUSCULAR DYSTROPHY, TYPE 2E: NEGATIVE
LIPOAMIDE DEHYDROGENASE DEFICIENCY: NEGATIVE
LIPOID ADRENAL HYPERPLASIA: NEGATIVE
LIPOPROTEIN LIPASE DEFICIENCY: NEGATIVE
LONG CHAIN 3-HYDROXYACYL-COA DEHYDROGENA: NEGATIVE
LYSINURIC PROTEIN INTOLERANCE: NEGATIVE
MAPLE SYRUP URINE DISEASE, TYPE 1A: NEGATIVE
MAPLE SYRUP URINE DISEASE, TYPE 1B: NEGATIVE
MECKEL-GRUBER SYNDROME, TYPE 1: NEGATIVE
MEDIUM CHAIN ACYL-COA DEHYDROGENASE DEFICIENCY: NEGATIVE
MEGALENCEPHALIC LEUKOENCEPHALOPATHY: NEGATIVE
METACHROMATIC LEUKODYSTROPHY GAUCHER: NEGATIVE
METACHROMATIC LEUKODYSTROPHY, ARSA: NEGATIVE
METHYLMALONIC ACIDURIA AND HOMOCYST CBIC: NEGATIVE
METHYLMALONIC ACIDURIA AND HOMOCYST CBID: NEGATIVE
METHYLMALONIC ACIDURIA, MMAA-RELATED: NEGATIVE
METHYLMALONIC ACIDURIA, MMAB-RELATED: NEGATIVE
METHYLMALONIC ACIDURIA, TYPE MUT(0): NEGATIVE
MICROPHTHALMIA/ANOPHTHALMIA: NEGATIVE
MITOCHONDRIAL COMPLEX 1 DEFIC NDUFAF5: NEGATIVE
MITOCHONDRIAL COMPLEX 1 DEFIC NDUFS6: NEGATIVE
MITOCHONDRIAL DNA DEPLETION SYNDROME 6: NEGATIVE
MITOCHONDRIAL MYOPATHY AND SIDEROBLASTIC: NEGATIVE
MUCOLIPIDOSIS II/IIIA: NEGATIVE
MUCOLIPIDOSIS III GAMMA: NEGATIVE
MUCOLIPIDOSIS, TYPE IV: NEGATIVE
MUCOPOLYSACCHARIDOSIS, TYPE I: NEGATIVE
MUCOPOLYSACCHARIDOSIS, TYPE II: NEGATIVE
MUCOPOLYSACCHARIDOSIS, TYPE IIIA: NEGATIVE
MUCOPOLYSACCHARIDOSIS, TYPE IIIB: NEGATIVE
MUCOPOLYSACCHARIDOSIS, TYPE IIIC: NEGATIVE
MUCOPOLYSACCHARIDOSIS, TYPE IIID: NEGATIVE
MUCOPOLYSACCHARIDOSIS, TYPE IVB: NEGATIVE
MUCOPOLYSACCHARIDOSIS, TYPE IX: NEGATIVE
MUCOPOLYSACCHARIDOSIS, TYPE VI: NEGATIVE
MULTIPLE SULPHATASE DEFICIENCY: NEGATIVE
MUSCLE-EYE-BRAIN DISEASE, POMGNT1-RELATE: POSITIVE
MYONEUROGASTROINTESTINAL ENCEPHALOPATHY: NEGATIVE
MYOTUBULAR MYOPATHY, X-LINKED: NEGATIVE
N-ACETYLGLUTAMATE SYNTHASE DEFICIENCY: NEGATIVE
NEMALINE MYOPATHY: NEGATIVE
NEURONAL CEROID LIPOFUSCINOSIS, CLN5: NEGATIVE
NEURONAL CEROID LIPOFUSCINOSIS, MFSD8: NEGATIVE
NEURONAL CEROID LIPOFUSCINOSIS, PPT1-REL: NEGATIVE
NEURONAL CEROID LIPOFUSCINOSIS, TPP1-REL: NEGATIVE
NEURONAL CEROID-LIPOFUSCINOSIS, CLN6: NEGATIVE
NEURONAL CEROID-LIPOFUSCINOSIS, CLN8: NEGATIVE
NIEMANN PICK DISEASE, TYPE C1/D: NEGATIVE
NIEMANN PICK DISEASE, TYPE C2: NEGATIVE
NIEMANN-PICK DISEASE, TYPES A/B: NEGATIVE
NIJMEGEN BREAKAGE SYNDROME: NEGATIVE
NON-SYNDROMIC HEARING LOSS, GJB2-RELATED: NEGATIVE
OCCIPITAL HORN SYNDROME: NEGATIVE
ODONTO-ONYCHO-DERMAL DYSPLASIA: NEGATIVE
OMENN SYNDROME: NEGATIVE
ORNITHINE AMINOTRANSFERASE DEFICIENCY: NEGATIVE
ORNITHINE TRANSCARBAMYLASE DEFICIENCY: NEGATIVE
OSTEOPETROSIS, INFANTILE MALIGNANT: NEGATIVE
PANEL NOTES: NORMAL
PENDRED SYNDROME: NEGATIVE
PHENYLKETONURIA: NEGATIVE
PITUITARY HORMONE DEFICIENCY, COMBINED 3: NEGATIVE
POLYCYSTIC KIDNEY DISEASE, AUTOSOMAL RECESSIVE: NEGATIVE
POLYGLANDULAR AUTOIMMUNE SYNDROME: NEGATIVE
PONTOCEREBELLAR HYPOPLASIA, RARS2-RELATE: NEGATIVE
PONTOCEREBELLAR HYPOPLASIA, TYPE 1A: NEGATIVE
PRIMARY CILIARY DYSKINESIA DNAH5-RELATED: NEGATIVE
PRIMARY CILIARY DYSKINESIA DNAI1-RELATED: NEGATIVE
PRIMARY CILIARY DYSKINESIA DNAI2-RELATED: NEGATIVE
PRIMARY HYPEROXALURIA, TYPE 1: NEGATIVE
PRIMARY HYPEROXALURIA, TYPE 2: NEGATIVE
PRIMARY HYPEROXALURIA, TYPE 3: NEGATIVE
PROGRESSIVE CEREBELLO-CEREBRAL ATROPHY: NEGATIVE
PROGRESSIVE FAMILIAL INTRAHEPATIC CHOLES: NEGATIVE
PROPIONIC ACIDEMIA, ALPHA SUBUNIT: NEGATIVE
PROPIONIC ACIDEMIA, BETA SUBUNIT: NEGATIVE
PYCNODYSOSTOSIS: NEGATIVE
PYRUVATE DEHYDROGENASE DEFICIENCY AUTOSO: NEGATIVE
PYRUVATE DEHYDROGENASE DEFICIENCY X-LINK: NEGATIVE
RENAL TUBULAR ACIDOSIS AND DEAFNESS: NEGATIVE
REPRODUCTIVE CARRIER MULTIGENE ANL BLD/T: POSITIVE
RETINITIS PIGMENTOSA 25: NEGATIVE
RETINITIS PIGMENTOSA 26: NEGATIVE
RETINITIS PIGMENTOSA 28: NEGATIVE
RETINITIS PIGMENTOSA 59: NEGATIVE
RHIZOMELIC CHONDRODYSPLASIA PUNCTATA 3: NEGATIVE
RHIZOMELIC CHONDRODYSPLASIA PUNCTATA I: NEGATIVE
RIBOFLAVIN RESPONSIVE COMPLEX 1 DEF: NEGATIVE
ROBERTS SYNDROME: NEGATIVE
RPT COMMENT: NORMAL
SALLA DISEASE: NEGATIVE
SANDHOFF DISEASE: NEGATIVE
SCHIMKE IMMUNOOSSEOUS DYSPLASIA: NEGATIVE
SEGAWA SYNDROME, AUTOSOMAL RECESSIVE: NEGATIVE
SEVERE COMBINED IMMUNODEFICIENCY, TYPE A: NEGATIVE
SEVERE COMBINED IMMUNODEFICIENCY: NEGATIVE
SJOGREN-LARSSON SYNDROME: NEGATIVE
SMITH-LEMLI-OPITZ SYNDROME: NEGATIVE
SMN1 GENE MUT ANL BLD/T: NEGATIVE
SPONDYLOTHORACIC DYSOSTOSIS: NEGATIVE
STEROID-RESISTANT NEPHROTIC SYNDROME: NEGATIVE
STUVE-WIEDEMANN SYNDROME: NEGATIVE
TEST PERFORMANCE INFO SPEC: NORMAL
TYROSINEMIA, TYPE I: NEGATIVE
USHER SYNDROME, TYPE 1B: NEGATIVE
USHER SYNDROME, TYPE 1C: NEGATIVE
USHER SYNDROME, TYPE 1D: NEGATIVE
USHER SYNDROME, TYPE 1F: POSITIVE
USHER SYNDROME, TYPE 2A: NEGATIVE
USHER SYNDROME, TYPE 3: NEGATIVE
VERY LONG CHAIN ACYL-COA DEHYDROGENASE: NEGATIVE
WALKER-WARBURG SYNDROME: NEGATIVE
WILSON DISEASE: NEGATIVE
WOLMAN DISEASE: NEGATIVE
X-LINKED JUVENILE RETINOSCHISIS: NEGATIVE
X-LINKED SEVERE COMBINED IMMUNODEFICIENC: NEGATIVE
ZELLWEGER SPECTRUM DISORDERS, PEX1-RELAT: NEGATIVE
ZELLWEGER SPECTRUM DISORDERS, PEX10-RELA: NEGATIVE
ZELLWEGER SPECTRUM DISORDERS, PEX2-RELAT: NEGATIVE
ZELLWEGER SPECTRUM DISORDERS, PEX6-RELAT: NEGATIVE

## 2024-09-11 ENCOUNTER — APPOINTMENT (OUTPATIENT)
Dept: OBGYN | Facility: CLINIC | Age: 24
End: 2024-09-11
Payer: MEDICAID

## 2024-09-11 ENCOUNTER — ASOB RESULT (OUTPATIENT)
Age: 24
End: 2024-09-11

## 2024-09-11 ENCOUNTER — APPOINTMENT (OUTPATIENT)
Dept: ANTEPARTUM | Facility: CLINIC | Age: 24
End: 2024-09-11

## 2024-09-11 VITALS
DIASTOLIC BLOOD PRESSURE: 79 MMHG | WEIGHT: 188 LBS | SYSTOLIC BLOOD PRESSURE: 120 MMHG | HEIGHT: 61 IN | BODY MASS INDEX: 35.5 KG/M2

## 2024-09-11 DIAGNOSIS — Z34.90 ENCOUNTER FOR SUPERVISION OF NORMAL PREGNANCY, UNSPECIFIED, UNSPECIFIED TRIMESTER: ICD-10-CM

## 2024-09-11 PROCEDURE — 76801 OB US < 14 WKS SINGLE FETUS: CPT

## 2024-09-11 PROCEDURE — 0502F SUBSEQUENT PRENATAL CARE: CPT

## 2024-09-11 PROCEDURE — 76813 OB US NUCHAL MEAS 1 GEST: CPT

## 2024-09-13 ENCOUNTER — NON-APPOINTMENT (OUTPATIENT)
Age: 24
End: 2024-09-13

## 2024-09-15 LAB
ADDITIONAL US: NORMAL
CRL SCAN TWIN B: NORMAL
CRL SCAN: NORMAL
CROWN RUMP LENGTH TWIN B: NORMAL
CROWN RUMP LENGTH: 61 MM
DIA MOM: 0.98
DIA VALUE: 209.4 PG/ML
DOWN SYNDROME AGE RISK: NORMAL
DOWN SYNDROME INTERPRETATION: NORMAL
DOWN SYNDROME SCREENING RISK: NORMAL
FIRST TRIMESTER SCREEN COMMENTS: NORMAL
FIRST TRIMESTER SCREEN NOTE: NORMAL
FIRST TRIMESTER SCREEN RESULTS: NORMAL
FIRST TRIMESTER SCREEN TEST RESULTS: NORMAL
GEST. AGE ON COLLECTION DATE: 12.4 WEEKS
HCG MOM: 0.83
HCG VALUE: 68.2 IU/ML
MATERNAL AGE AT EDD: 26.2 YR
NT MOM TWIN B: NORMAL
NT TWIN B: NORMAL
NUCHAL TRANSLUCENCY (NT): 1.5 MM
NUCHAL TRANSLUCENCY MOM: 1.13
NUMBER OF FETUSES: 1
PAPP-A MOM: 1.77
PAPP-A VALUE: 1309.2 NG/ML
RACE: NORMAL
SONOGRAPHER ID#: NORMAL
TRISOMY 18 AGE RISK: NORMAL
TRISOMY 18 INTERPRETATION: NORMAL
TRISOMY 18 SCREENING RISK: NORMAL
WEIGHT AFP: 188 LBS

## 2024-09-16 LAB
CHROMOSOME13 INTERPRETATION: NORMAL
CHROMOSOME13 TEST RESULT: NORMAL
CHROMOSOME18 INTERPRETATION: NORMAL
CHROMOSOME18 TEST RESULT: NORMAL
CHROMOSOME21 INTERPRETATION: NORMAL
CHROMOSOME21 TEST RESULT: NORMAL
FETAL FRACTION: NORMAL
PERFORMANCE AND LIMITATIONS: NORMAL
SEX CHROMOSOME INTERPRETATION: NORMAL
SEX CHROMOSOME TEST RESULT: NORMAL
VERIFI PRENATAL TEST: NOT DETECTED

## 2024-10-09 ENCOUNTER — APPOINTMENT (OUTPATIENT)
Dept: OBGYN | Facility: CLINIC | Age: 24
End: 2024-10-09
Payer: MEDICAID

## 2024-10-09 VITALS
HEIGHT: 61 IN | WEIGHT: 186 LBS | DIASTOLIC BLOOD PRESSURE: 81 MMHG | BODY MASS INDEX: 35.12 KG/M2 | SYSTOLIC BLOOD PRESSURE: 120 MMHG

## 2024-10-09 DIAGNOSIS — Z3A.16 16 WEEKS GESTATION OF PREGNANCY: ICD-10-CM

## 2024-10-09 PROCEDURE — 0502F SUBSEQUENT PRENATAL CARE: CPT

## 2024-10-09 PROCEDURE — 36415 COLL VENOUS BLD VENIPUNCTURE: CPT

## 2024-10-11 LAB
AFP INTERP SERPL-IMP: NORMAL
AFP INTERP SERPL-IMP: NORMAL
AFP MOM CUT-OFF: 2.5
AFP MOM SERPL: 1.4
AFP PERCENTILE: 85.4
AFP SERPL-ACNC: 42.33 NG/ML
CARBAMAZEPINE?: NO
CURRENT SMOKER: NORMAL
DIABETES STATUS PATIENT: NORMAL
GA: NORMAL
GESTATIONAL AGE METHOD: NORMAL
HX OF NTD NARR: NORMAL
MULTIPLE PREGNANCY: NORMAL
NEURAL TUBE DEFECT RISK FETUS: NORMAL
NEURAL TUBE DEFECT RISK POP: NORMAL
RECOM F/U: NO
TEST PERFORMANCE INFO SPEC: NORMAL
VALPROIC ACID?: NORMAL

## 2024-10-16 ENCOUNTER — NON-APPOINTMENT (OUTPATIENT)
Age: 24
End: 2024-10-16

## 2024-11-04 ENCOUNTER — APPOINTMENT (OUTPATIENT)
Dept: OBGYN | Facility: CLINIC | Age: 24
End: 2024-11-04
Payer: MEDICAID

## 2024-11-04 VITALS — WEIGHT: 187 LBS | BODY MASS INDEX: 35.33 KG/M2 | SYSTOLIC BLOOD PRESSURE: 125 MMHG | DIASTOLIC BLOOD PRESSURE: 85 MMHG

## 2024-11-04 DIAGNOSIS — R87.610 ATYPICAL SQUAMOUS CELLS OF UNDETERMINED SIGNIFICANCE ON CYTOLOGIC SMEAR OF CERVIX (ASC-US): ICD-10-CM

## 2024-11-04 DIAGNOSIS — R87.810 ATYPICAL SQUAMOUS CELLS OF UNDETERMINED SIGNIFICANCE ON CYTOLOGIC SMEAR OF CERVIX (ASC-US): ICD-10-CM

## 2024-11-04 PROCEDURE — 57454 BX/CURETT OF CERVIX W/SCOPE: CPT

## 2024-11-11 ENCOUNTER — NON-APPOINTMENT (OUTPATIENT)
Age: 24
End: 2024-11-11

## 2024-11-11 ENCOUNTER — ASOB RESULT (OUTPATIENT)
Age: 24
End: 2024-11-11

## 2024-11-11 ENCOUNTER — APPOINTMENT (OUTPATIENT)
Dept: ANTEPARTUM | Facility: CLINIC | Age: 24
End: 2024-11-11
Payer: MEDICAID

## 2024-11-11 ENCOUNTER — APPOINTMENT (OUTPATIENT)
Dept: OBGYN | Facility: CLINIC | Age: 24
End: 2024-11-11
Payer: MEDICAID

## 2024-11-11 VITALS
HEIGHT: 61 IN | DIASTOLIC BLOOD PRESSURE: 77 MMHG | SYSTOLIC BLOOD PRESSURE: 124 MMHG | WEIGHT: 190 LBS | BODY MASS INDEX: 35.87 KG/M2

## 2024-11-11 PROCEDURE — 0502F SUBSEQUENT PRENATAL CARE: CPT

## 2024-11-11 PROCEDURE — 76820 UMBILICAL ARTERY ECHO: CPT

## 2024-11-11 PROCEDURE — 76811 OB US DETAILED SNGL FETUS: CPT

## 2024-11-11 RX ORDER — ASCORBIC ACID, CHOLECALCIFEROL, .ALPHA.-TOCOPHEROL ACETATE, DL-, PYRIDOXINE, FOLIC ACID, CYANOCOBALAMIN, CALCIUM, FERROUS FUMARATE, MAGNESIUM, DOCONEXENT 85; 200; 10; 25; 1; 12; 140; 27; 45; 300 [IU]/1; [IU]/1; [IU]/1; [IU]/1; MG/1; UG/1; MG/1; MG/1; MG/1; MG/1
27-0.6-0.4-3 CAPSULE, GELATIN COATED ORAL
Qty: 90 | Refills: 3 | Status: ACTIVE | COMMUNITY
Start: 2024-11-11 | End: 1900-01-01

## 2024-11-12 LAB — CORE LAB BIOPSY: NORMAL

## 2024-11-14 ENCOUNTER — APPOINTMENT (OUTPATIENT)
Dept: ANTEPARTUM | Facility: CLINIC | Age: 24
End: 2024-11-14

## 2024-12-09 ENCOUNTER — APPOINTMENT (OUTPATIENT)
Dept: ANTEPARTUM | Facility: CLINIC | Age: 24
End: 2024-12-09
Payer: MEDICAID

## 2024-12-09 ENCOUNTER — ASOB RESULT (OUTPATIENT)
Age: 24
End: 2024-12-09

## 2024-12-09 ENCOUNTER — APPOINTMENT (OUTPATIENT)
Dept: OBGYN | Facility: CLINIC | Age: 24
End: 2024-12-09
Payer: MEDICAID

## 2024-12-09 VITALS — DIASTOLIC BLOOD PRESSURE: 70 MMHG | SYSTOLIC BLOOD PRESSURE: 113 MMHG

## 2024-12-09 VITALS — BODY MASS INDEX: 36.47 KG/M2 | WEIGHT: 193 LBS

## 2024-12-09 DIAGNOSIS — H90.5 UNSPECIFIED SENSORINEURAL HEARING LOSS: ICD-10-CM

## 2024-12-09 DIAGNOSIS — Z34.90 ENCOUNTER FOR SUPERVISION OF NORMAL PREGNANCY, UNSPECIFIED, UNSPECIFIED TRIMESTER: ICD-10-CM

## 2024-12-09 PROCEDURE — 36415 COLL VENOUS BLD VENIPUNCTURE: CPT

## 2024-12-09 PROCEDURE — 0502F SUBSEQUENT PRENATAL CARE: CPT

## 2024-12-09 PROCEDURE — 76816 OB US FOLLOW-UP PER FETUS: CPT

## 2024-12-10 LAB
HCT VFR BLD CALC: 36.2 %
HGB BLD-MCNC: 11.7 G/DL
MCHC RBC-ENTMCNC: 31.2 PG
MCHC RBC-ENTMCNC: 32.3 G/DL
MCV RBC AUTO: 96.5 FL
PLATELET # BLD AUTO: 302 K/UL
RBC # BLD: 3.75 M/UL
RBC # FLD: 13.7 %
WBC # FLD AUTO: 12.07 K/UL

## 2024-12-11 LAB — GLUCOSE 1H P 50 G GLC PO SERPL-MCNC: 115 MG/DL

## 2025-01-06 ENCOUNTER — NON-APPOINTMENT (OUTPATIENT)
Age: 25
End: 2025-01-06

## 2025-01-06 ENCOUNTER — APPOINTMENT (OUTPATIENT)
Dept: OBGYN | Facility: CLINIC | Age: 25
End: 2025-01-06
Payer: MEDICAID

## 2025-01-06 VITALS
SYSTOLIC BLOOD PRESSURE: 107 MMHG | WEIGHT: 190 LBS | HEIGHT: 61 IN | DIASTOLIC BLOOD PRESSURE: 73 MMHG | BODY MASS INDEX: 35.87 KG/M2

## 2025-01-06 DIAGNOSIS — E55.9 VITAMIN D DEFICIENCY, UNSPECIFIED: ICD-10-CM

## 2025-01-06 DIAGNOSIS — Z98.891 HISTORY OF UTERINE SCAR FROM PREVIOUS SURGERY: ICD-10-CM

## 2025-01-06 DIAGNOSIS — H90.5 UNSPECIFIED SENSORINEURAL HEARING LOSS: ICD-10-CM

## 2025-01-06 DIAGNOSIS — Z23 ENCOUNTER FOR IMMUNIZATION: ICD-10-CM

## 2025-01-06 PROCEDURE — 0502F SUBSEQUENT PRENATAL CARE: CPT

## 2025-01-06 PROCEDURE — 36415 COLL VENOUS BLD VENIPUNCTURE: CPT

## 2025-01-06 PROCEDURE — 90715 TDAP VACCINE 7 YRS/> IM: CPT

## 2025-01-06 PROCEDURE — 90471 IMMUNIZATION ADMIN: CPT

## 2025-01-07 LAB — T PALLIDUM AB SER QL IA: NEGATIVE

## 2025-01-21 ENCOUNTER — APPOINTMENT (OUTPATIENT)
Dept: OBGYN | Facility: CLINIC | Age: 25
End: 2025-01-21

## 2025-01-21 ENCOUNTER — APPOINTMENT (OUTPATIENT)
Dept: ANTEPARTUM | Facility: CLINIC | Age: 25
End: 2025-01-21

## 2025-01-22 ENCOUNTER — NON-APPOINTMENT (OUTPATIENT)
Age: 25
End: 2025-01-22

## 2025-01-29 ENCOUNTER — APPOINTMENT (OUTPATIENT)
Dept: OBGYN | Facility: CLINIC | Age: 25
End: 2025-01-29
Payer: MEDICAID

## 2025-01-29 ENCOUNTER — APPOINTMENT (OUTPATIENT)
Dept: ANTEPARTUM | Facility: CLINIC | Age: 25
End: 2025-01-29
Payer: MEDICAID

## 2025-01-29 ENCOUNTER — ASOB RESULT (OUTPATIENT)
Age: 25
End: 2025-01-29

## 2025-01-29 VITALS
SYSTOLIC BLOOD PRESSURE: 115 MMHG | WEIGHT: 190 LBS | BODY MASS INDEX: 35.87 KG/M2 | HEIGHT: 61 IN | DIASTOLIC BLOOD PRESSURE: 74 MMHG

## 2025-01-29 PROCEDURE — 76819 FETAL BIOPHYS PROFIL W/O NST: CPT | Mod: 59

## 2025-01-29 PROCEDURE — 0502F SUBSEQUENT PRENATAL CARE: CPT

## 2025-01-29 PROCEDURE — 76816 OB US FOLLOW-UP PER FETUS: CPT

## 2025-02-10 ENCOUNTER — APPOINTMENT (OUTPATIENT)
Dept: OBGYN | Facility: CLINIC | Age: 25
End: 2025-02-10
Payer: MEDICAID

## 2025-02-10 VITALS
HEIGHT: 61 IN | SYSTOLIC BLOOD PRESSURE: 111 MMHG | DIASTOLIC BLOOD PRESSURE: 68 MMHG | WEIGHT: 192 LBS | BODY MASS INDEX: 36.25 KG/M2

## 2025-02-10 PROCEDURE — 0502F SUBSEQUENT PRENATAL CARE: CPT

## 2025-02-24 ENCOUNTER — APPOINTMENT (OUTPATIENT)
Dept: OBGYN | Facility: CLINIC | Age: 25
End: 2025-02-24

## 2025-02-25 ENCOUNTER — APPOINTMENT (OUTPATIENT)
Dept: OBGYN | Facility: CLINIC | Age: 25
End: 2025-02-25
Payer: MEDICAID

## 2025-02-25 VITALS
WEIGHT: 196 LBS | BODY MASS INDEX: 37 KG/M2 | HEIGHT: 61 IN | DIASTOLIC BLOOD PRESSURE: 85 MMHG | SYSTOLIC BLOOD PRESSURE: 118 MMHG

## 2025-02-25 DIAGNOSIS — Z3A.36 36 WEEKS GESTATION OF PREGNANCY: ICD-10-CM

## 2025-02-25 PROCEDURE — 36415 COLL VENOUS BLD VENIPUNCTURE: CPT

## 2025-02-25 PROCEDURE — 0502F SUBSEQUENT PRENATAL CARE: CPT

## 2025-03-01 LAB
BASOPHILS # BLD AUTO: 0.03 K/UL
BASOPHILS NFR BLD AUTO: 0.3 %
EOSINOPHIL # BLD AUTO: 0.05 K/UL
EOSINOPHIL NFR BLD AUTO: 0.5 %
HCT VFR BLD CALC: 38.9 %
HGB BLD-MCNC: 12.3 G/DL
IMM GRANULOCYTES NFR BLD AUTO: 0.7 %
LYMPHOCYTES # BLD AUTO: 2.45 K/UL
LYMPHOCYTES NFR BLD AUTO: 23 %
MAN DIFF?: NORMAL
MCHC RBC-ENTMCNC: 30.6 PG
MCHC RBC-ENTMCNC: 31.6 G/DL
MCV RBC AUTO: 96.8 FL
MONOCYTES # BLD AUTO: 0.56 K/UL
MONOCYTES NFR BLD AUTO: 5.2 %
NEUTROPHILS # BLD AUTO: 7.51 K/UL
NEUTROPHILS NFR BLD AUTO: 70.3 %
PLATELET # BLD AUTO: 249 K/UL
RBC # BLD: 4.02 M/UL
RBC # FLD: 14.9 %
WBC # FLD AUTO: 10.67 K/UL

## 2025-03-03 LAB
GP B STREP DNA SPEC QL NAA+PROBE: DETECTED
HIV1+2 AB SPEC QL IA.RAPID: NONREACTIVE
SOURCE GBS: NORMAL
T PALLIDUM AB SER QL IA: NEGATIVE

## 2025-03-04 ENCOUNTER — APPOINTMENT (OUTPATIENT)
Dept: ANTEPARTUM | Facility: CLINIC | Age: 25
End: 2025-03-04
Payer: MEDICAID

## 2025-03-04 ENCOUNTER — ASOB RESULT (OUTPATIENT)
Age: 25
End: 2025-03-04

## 2025-03-04 ENCOUNTER — APPOINTMENT (OUTPATIENT)
Dept: OBGYN | Facility: CLINIC | Age: 25
End: 2025-03-04
Payer: MEDICAID

## 2025-03-04 VITALS
BODY MASS INDEX: 36.44 KG/M2 | HEIGHT: 61 IN | SYSTOLIC BLOOD PRESSURE: 131 MMHG | DIASTOLIC BLOOD PRESSURE: 87 MMHG | WEIGHT: 193 LBS

## 2025-03-04 PROCEDURE — 0502F SUBSEQUENT PRENATAL CARE: CPT

## 2025-03-04 PROCEDURE — 76816 OB US FOLLOW-UP PER FETUS: CPT

## 2025-03-04 PROCEDURE — 76819 FETAL BIOPHYS PROFIL W/O NST: CPT | Mod: 59

## 2025-03-04 NOTE — OB PROVIDER H&P - NSHPROSALLOTHERNEGRD_GEN_ALL_CORE
relevant images obtained prior to today's visit were also independently interpreted.      History: 65 y.o. year old female status post revision Right Total Hip Arthroplasty    Comparison: 1/8/2025 x-ray right hip    Findings: 2 views of the Right Hip (AP/lateral) in a skeletally mature patient showing redemonstration revision Right Total Hip Arthroplasty and open reduction internal fixation of right femur..  No signs of hardware failure or loosening.  Unchanged alignment.  No new acute osseous abnormalities.  No radiopaque foreign bodies. No subluxations or dislocations noted.    Impression: Stable open reduction internal fixation right femur revision Right Total Hip Arthroplasty     Assessment:   65 y.o. year old female s/p open reduction internal fixation right femur and revision right total hip arthroplasty  Plan:   Lengthy discussion had with patient about current clinical state.     Imaging reviewed with patient.    Weight-bear as tolerated Right lower extremity.     Physical therapy referral provided for strengthening and range of motion as well as gait training.    Patient will follow-up in 6 weeks or sooner if any acute issues arise.  All questions answered.  Patient is amenable to this plan.      Electronically signed by CHELSEY Damon on 3/4/2025 at 11:41 AM    This note is created with the assistance of a speech recognition program.  While intending to generate a document that actually reflects the content of the visit, the document can still have some errors including those of syntax and sound a like substitutions which may escape proof reading.  In such instances, actual meaning can be extrapolated by contextual diversion     All other review of systems negative, except as noted in HPI

## 2025-03-11 ENCOUNTER — APPOINTMENT (OUTPATIENT)
Dept: OBGYN | Facility: CLINIC | Age: 25
End: 2025-03-11
Payer: MEDICAID

## 2025-03-11 VITALS — DIASTOLIC BLOOD PRESSURE: 57 MMHG | WEIGHT: 195 LBS | SYSTOLIC BLOOD PRESSURE: 118 MMHG | BODY MASS INDEX: 36.85 KG/M2

## 2025-03-11 PROCEDURE — 0502F SUBSEQUENT PRENATAL CARE: CPT

## 2025-03-18 ENCOUNTER — NON-APPOINTMENT (OUTPATIENT)
Age: 25
End: 2025-03-18

## 2025-03-18 ENCOUNTER — APPOINTMENT (OUTPATIENT)
Dept: OBGYN | Facility: CLINIC | Age: 25
End: 2025-03-18
Payer: MEDICAID

## 2025-03-18 VITALS
DIASTOLIC BLOOD PRESSURE: 82 MMHG | HEIGHT: 61 IN | BODY MASS INDEX: 36.44 KG/M2 | SYSTOLIC BLOOD PRESSURE: 122 MMHG | WEIGHT: 193 LBS

## 2025-03-18 PROCEDURE — 0502F SUBSEQUENT PRENATAL CARE: CPT

## 2025-03-21 ENCOUNTER — NON-APPOINTMENT (OUTPATIENT)
Age: 25
End: 2025-03-21

## 2025-03-21 ENCOUNTER — OUTPATIENT (OUTPATIENT)
Dept: OUTPATIENT SERVICES | Facility: HOSPITAL | Age: 25
LOS: 1 days | End: 2025-03-21

## 2025-03-21 VITALS
HEART RATE: 96 BPM | RESPIRATION RATE: 16 BRPM | SYSTOLIC BLOOD PRESSURE: 123 MMHG | WEIGHT: 192.02 LBS | HEIGHT: 61.5 IN | TEMPERATURE: 98 F | DIASTOLIC BLOOD PRESSURE: 88 MMHG

## 2025-03-21 DIAGNOSIS — O34.211 MATERNAL CARE FOR LOW TRANSVERSE SCAR FROM PREVIOUS CESAREAN DELIVERY: ICD-10-CM

## 2025-03-21 DIAGNOSIS — Z98.891 HISTORY OF UTERINE SCAR FROM PREVIOUS SURGERY: Chronic | ICD-10-CM

## 2025-03-21 LAB
APPEARANCE UR: CLEAR — SIGNIFICANT CHANGE UP
BACTERIA # UR AUTO: ABNORMAL /HPF
BASOPHILS # BLD AUTO: 0.01 K/UL — SIGNIFICANT CHANGE UP (ref 0–0.2)
BASOPHILS NFR BLD AUTO: 0.1 % — SIGNIFICANT CHANGE UP (ref 0–2)
BILIRUB UR-MCNC: NEGATIVE — SIGNIFICANT CHANGE UP
BLD GP AB SCN SERPL QL: NEGATIVE — SIGNIFICANT CHANGE UP
COLOR SPEC: YELLOW — SIGNIFICANT CHANGE UP
DIFF PNL FLD: NEGATIVE — SIGNIFICANT CHANGE UP
EOSINOPHIL # BLD AUTO: 0.04 K/UL — SIGNIFICANT CHANGE UP (ref 0–0.5)
EOSINOPHIL NFR BLD AUTO: 0.4 % — SIGNIFICANT CHANGE UP (ref 0–6)
GLUCOSE UR QL: NEGATIVE MG/DL — SIGNIFICANT CHANGE UP
HCT VFR BLD CALC: 36.1 % — SIGNIFICANT CHANGE UP (ref 34.5–45)
HGB BLD-MCNC: 12.2 G/DL — SIGNIFICANT CHANGE UP (ref 11.5–15.5)
IMM GRANULOCYTES NFR BLD AUTO: 1.6 % — HIGH (ref 0–0.9)
KETONES UR-MCNC: NEGATIVE MG/DL — SIGNIFICANT CHANGE UP
LEUKOCYTE ESTERASE UR-ACNC: ABNORMAL
LYMPHOCYTES # BLD AUTO: 2.78 K/UL — SIGNIFICANT CHANGE UP (ref 1–3.3)
LYMPHOCYTES # BLD AUTO: 27 % — SIGNIFICANT CHANGE UP (ref 13–44)
MCHC RBC-ENTMCNC: 33.8 G/DL — SIGNIFICANT CHANGE UP (ref 32–36)
MCV RBC AUTO: 90 FL — SIGNIFICANT CHANGE UP (ref 80–100)
MONOCYTES # BLD AUTO: 0.71 K/UL — SIGNIFICANT CHANGE UP (ref 0–0.9)
MONOCYTES NFR BLD AUTO: 6.9 % — SIGNIFICANT CHANGE UP (ref 2–14)
NEUTROPHILS # BLD AUTO: 6.58 K/UL — SIGNIFICANT CHANGE UP (ref 1.8–7.4)
NEUTROPHILS NFR BLD AUTO: 64 % — SIGNIFICANT CHANGE UP (ref 43–77)
NITRITE UR-MCNC: NEGATIVE — SIGNIFICANT CHANGE UP
PH UR: 6.5 — SIGNIFICANT CHANGE UP (ref 5–8)
PLATELET # BLD AUTO: 306 K/UL — SIGNIFICANT CHANGE UP (ref 150–400)
PROT UR-MCNC: 30 MG/DL
RBC # BLD: 4.01 M/UL — SIGNIFICANT CHANGE UP (ref 3.8–5.2)
RBC # FLD: 14 % — SIGNIFICANT CHANGE UP (ref 10.3–14.5)
RBC CASTS # UR COMP ASSIST: 1 /HPF — SIGNIFICANT CHANGE UP (ref 0–4)
RH IG SCN BLD-IMP: POSITIVE — SIGNIFICANT CHANGE UP
SP GR SPEC: 1.02 — SIGNIFICANT CHANGE UP (ref 1–1.03)
SQUAMOUS # UR AUTO: 2 /HPF — SIGNIFICANT CHANGE UP (ref 0–5)
UROBILINOGEN FLD QL: 0.2 MG/DL — SIGNIFICANT CHANGE UP (ref 0.2–1)
WBC # BLD: 10.28 K/UL — SIGNIFICANT CHANGE UP (ref 3.8–10.5)
WBC # FLD AUTO: 10.28 K/UL — SIGNIFICANT CHANGE UP (ref 3.8–10.5)
WBC UR QL: 24 /HPF — HIGH (ref 0–5)

## 2025-03-21 RX ORDER — SODIUM CHLORIDE 9 G/1000ML
1000 INJECTION, SOLUTION INTRAVENOUS
Refills: 0 | Status: DISCONTINUED | OUTPATIENT
Start: 2025-03-28 | End: 2025-03-30

## 2025-03-21 RX ORDER — PRENATAL 136/IRON/FOLIC ACID 27 MG-1 MG
1 TABLET ORAL
Refills: 0 | DISCHARGE

## 2025-03-21 NOTE — OB PST NOTE - NSLOWDOSEASPIRIN_OBGYN_ALL_OB
Detail Level: Detailed Quality 431: Preventive Care And Screening: Unhealthy Alcohol Use - Screening: Patient screened for unhealthy alcohol use using a single question and scores less than 2 times per year Quality 226: Preventive Care And Screening: Tobacco Use: Screening And Cessation Intervention: Patient screened for tobacco and never smoked No

## 2025-03-21 NOTE — OB PST NOTE - HISTORY OF PRESENT ILLNESS
25  year old pregnant female presents to presurgical testing scheduled for Caesarean section on 3/28/2025. Patient denies vaginal  bleeding, spotting or leakage of amniotic fluid. Patient denies regular contractions. Patient reports positive fetal movement.

## 2025-03-21 NOTE — OB PST NOTE - PROBLEM SELECTOR PLAN 1
Patient is scheduled for Caesarean section on 3/28/2025. Pre-op instructions provided. Pt given verbal and written instructions with teach back on chlorhexidine shampoo, Gatorade, and anesthesia. Pt verbalized understanding with return demonstration.     CBC, T/S and UA done at PST.  Pt advised  to follow OB for all medication instruction.

## 2025-03-21 NOTE — OB PST NOTE - ALCOHOL USE HISTORY SINGLE SELECT
Pharmacy Tube Feeding Consult    Medication reviewed for administration by feeding tube and for potential food/drug interactions.    Recommendation: No changes are needed at this time.     Pharmacy will continue to follow as new medications are ordered.     never

## 2025-03-22 PROBLEM — E66.9 OBESITY, UNSPECIFIED: Chronic | Status: INACTIVE | Noted: 2024-02-12 | Resolved: 2025-03-21

## 2025-03-24 ENCOUNTER — ASOB RESULT (OUTPATIENT)
Age: 25
End: 2025-03-24

## 2025-03-24 ENCOUNTER — APPOINTMENT (OUTPATIENT)
Dept: ANTEPARTUM | Facility: CLINIC | Age: 25
End: 2025-03-24
Payer: MEDICAID

## 2025-03-24 ENCOUNTER — APPOINTMENT (OUTPATIENT)
Dept: OBGYN | Facility: CLINIC | Age: 25
End: 2025-03-24
Payer: MEDICAID

## 2025-03-24 VITALS — SYSTOLIC BLOOD PRESSURE: 122 MMHG | DIASTOLIC BLOOD PRESSURE: 86 MMHG | WEIGHT: 192 LBS | BODY MASS INDEX: 36.28 KG/M2

## 2025-03-24 PROCEDURE — 76816 OB US FOLLOW-UP PER FETUS: CPT

## 2025-03-24 PROCEDURE — 76818 FETAL BIOPHYS PROFILE W/NST: CPT | Mod: 59

## 2025-03-24 PROCEDURE — 0502F SUBSEQUENT PRENATAL CARE: CPT

## 2025-03-27 ENCOUNTER — ASOB RESULT (OUTPATIENT)
Age: 25
End: 2025-03-27

## 2025-03-27 ENCOUNTER — APPOINTMENT (OUTPATIENT)
Dept: ANTEPARTUM | Facility: CLINIC | Age: 25
End: 2025-03-27

## 2025-03-27 ENCOUNTER — APPOINTMENT (OUTPATIENT)
Dept: OBGYN | Facility: CLINIC | Age: 25
End: 2025-03-27
Payer: MEDICAID

## 2025-03-27 VITALS — BODY MASS INDEX: 37.6 KG/M2 | WEIGHT: 199 LBS | DIASTOLIC BLOOD PRESSURE: 72 MMHG | SYSTOLIC BLOOD PRESSURE: 108 MMHG

## 2025-03-27 PROCEDURE — 0502F SUBSEQUENT PRENATAL CARE: CPT

## 2025-03-27 PROCEDURE — 76818 FETAL BIOPHYS PROFILE W/NST: CPT

## 2025-03-28 ENCOUNTER — INPATIENT (INPATIENT)
Facility: HOSPITAL | Age: 25
LOS: 1 days | Discharge: HOME | End: 2025-03-30
Attending: STUDENT IN AN ORGANIZED HEALTH CARE EDUCATION/TRAINING PROGRAM | Admitting: STUDENT IN AN ORGANIZED HEALTH CARE EDUCATION/TRAINING PROGRAM
Payer: MEDICAID

## 2025-03-28 ENCOUNTER — APPOINTMENT (OUTPATIENT)
Dept: OBGYN | Facility: HOSPITAL | Age: 25
End: 2025-03-28

## 2025-03-28 VITALS — DIASTOLIC BLOOD PRESSURE: 81 MMHG | HEART RATE: 115 BPM | SYSTOLIC BLOOD PRESSURE: 132 MMHG

## 2025-03-28 DIAGNOSIS — Z98.891 HISTORY OF UTERINE SCAR FROM PREVIOUS SURGERY: ICD-10-CM

## 2025-03-28 DIAGNOSIS — Z98.891 HISTORY OF UTERINE SCAR FROM PREVIOUS SURGERY: Chronic | ICD-10-CM

## 2025-03-28 DIAGNOSIS — O34.211 MATERNAL CARE FOR LOW TRANSVERSE SCAR FROM PREVIOUS CESAREAN DELIVERY: ICD-10-CM

## 2025-03-28 LAB
BASOPHILS # BLD AUTO: 0.02 K/UL — SIGNIFICANT CHANGE UP (ref 0–0.2)
BASOPHILS NFR BLD AUTO: 0.2 % — SIGNIFICANT CHANGE UP (ref 0–2)
BLD GP AB SCN SERPL QL: NEGATIVE — SIGNIFICANT CHANGE UP
EOSINOPHIL # BLD AUTO: 0.02 K/UL — SIGNIFICANT CHANGE UP (ref 0–0.5)
EOSINOPHIL NFR BLD AUTO: 0.2 % — SIGNIFICANT CHANGE UP (ref 0–6)
HCT VFR BLD CALC: 39.8 % — SIGNIFICANT CHANGE UP (ref 34.5–45)
HGB BLD-MCNC: 13.3 G/DL — SIGNIFICANT CHANGE UP (ref 11.5–15.5)
IANC: 7.84 K/UL — HIGH (ref 1.8–7.4)
IMM GRANULOCYTES NFR BLD AUTO: 0.4 % — SIGNIFICANT CHANGE UP (ref 0–0.9)
LYMPHOCYTES # BLD AUTO: 2.21 K/UL — SIGNIFICANT CHANGE UP (ref 1–3.3)
LYMPHOCYTES # BLD AUTO: 20.3 % — SIGNIFICANT CHANGE UP (ref 13–44)
MCHC RBC-ENTMCNC: 30.5 PG — SIGNIFICANT CHANGE UP (ref 27–34)
MCHC RBC-ENTMCNC: 33.4 G/DL — SIGNIFICANT CHANGE UP (ref 32–36)
MCV RBC AUTO: 91.3 FL — SIGNIFICANT CHANGE UP (ref 80–100)
MONOCYTES # BLD AUTO: 0.75 K/UL — SIGNIFICANT CHANGE UP (ref 0–0.9)
MONOCYTES NFR BLD AUTO: 6.9 % — SIGNIFICANT CHANGE UP (ref 2–14)
NEUTROPHILS # BLD AUTO: 7.84 K/UL — HIGH (ref 1.8–7.4)
NEUTROPHILS NFR BLD AUTO: 72 % — SIGNIFICANT CHANGE UP (ref 43–77)
NRBC # BLD AUTO: 0 K/UL — SIGNIFICANT CHANGE UP (ref 0–0)
NRBC # FLD: 0 K/UL — SIGNIFICANT CHANGE UP (ref 0–0)
NRBC BLD AUTO-RTO: 0 /100 WBCS — SIGNIFICANT CHANGE UP (ref 0–0)
PLATELET # BLD AUTO: 270 K/UL — SIGNIFICANT CHANGE UP (ref 150–400)
RBC # BLD: 4.36 M/UL — SIGNIFICANT CHANGE UP (ref 3.8–5.2)
RBC # FLD: 14.1 % — SIGNIFICANT CHANGE UP (ref 10.3–14.5)
RH IG SCN BLD-IMP: POSITIVE — SIGNIFICANT CHANGE UP
WBC # BLD: 10.88 K/UL — HIGH (ref 3.8–10.5)
WBC # FLD AUTO: 10.88 K/UL — HIGH (ref 3.8–10.5)

## 2025-03-28 PROCEDURE — 59510 CESAREAN DELIVERY: CPT | Mod: U9

## 2025-03-28 PROCEDURE — 59514 CESAREAN DELIVERY ONLY: CPT | Mod: AS

## 2025-03-28 PROCEDURE — 59025 FETAL NON-STRESS TEST: CPT | Mod: 26

## 2025-03-28 RX ORDER — DEXAMETHASONE 0.5 MG/1
4 TABLET ORAL EVERY 6 HOURS
Refills: 0 | Status: DISCONTINUED | OUTPATIENT
Start: 2025-03-28 | End: 2025-03-30

## 2025-03-28 RX ORDER — ACETAMINOPHEN 500 MG/5ML
975 LIQUID (ML) ORAL
Refills: 0 | Status: DISCONTINUED | OUTPATIENT
Start: 2025-03-28 | End: 2025-03-30

## 2025-03-28 RX ORDER — OXYCODONE HYDROCHLORIDE 30 MG/1
5 TABLET ORAL
Refills: 0 | Status: DISCONTINUED | OUTPATIENT
Start: 2025-03-28 | End: 2025-03-30

## 2025-03-28 RX ORDER — METOCLOPRAMIDE HCL 10 MG
10 TABLET ORAL ONCE
Refills: 0 | Status: COMPLETED | OUTPATIENT
Start: 2025-03-28 | End: 2025-03-28

## 2025-03-28 RX ORDER — MAGNESIUM HYDROXIDE 400 MG/5ML
30 SUSPENSION ORAL
Refills: 0 | Status: DISCONTINUED | OUTPATIENT
Start: 2025-03-28 | End: 2025-03-30

## 2025-03-28 RX ORDER — CLOSTRIDIUM TETANI TOXOID ANTIGEN (FORMALDEHYDE INACTIVATED), CORYNEBACTERIUM DIPHTHERIAE TOXOID ANTIGEN (FORMALDEHYDE INACTIVATED), BORDETELLA PERTUSSIS TOXOID ANTIGEN (GLUTARALDEHYDE INACTIVATED), BORDETELLA PERTUSSIS FILAMENTOUS HEMAGGLUTININ ANTIGEN (FORMALDEHYDE INACTIVATED), BORDETELLA PERTUSSIS PERTACTIN ANTIGEN, AND BORDETELLA PERTUSSIS FIMBRIAE 2/3 ANTIGEN 5; 2; 2.5; 5; 3; 5 [LF]/.5ML; [LF]/.5ML; UG/.5ML; UG/.5ML; UG/.5ML; UG/.5ML
0.5 INJECTION, SUSPENSION INTRAMUSCULAR ONCE
Refills: 0 | Status: DISCONTINUED | OUTPATIENT
Start: 2025-03-28 | End: 2025-03-30

## 2025-03-28 RX ORDER — SODIUM CHLORIDE 9 G/1000ML
1000 INJECTION, SOLUTION INTRAVENOUS ONCE
Refills: 0 | Status: COMPLETED | OUTPATIENT
Start: 2025-03-28 | End: 2025-03-28

## 2025-03-28 RX ORDER — METOCLOPRAMIDE HCL 10 MG
5 TABLET ORAL ONCE
Refills: 0 | Status: DISCONTINUED | OUTPATIENT
Start: 2025-03-28 | End: 2025-03-28

## 2025-03-28 RX ORDER — HEPARIN SODIUM 1000 [USP'U]/ML
5000 INJECTION INTRAVENOUS; SUBCUTANEOUS EVERY 12 HOURS
Refills: 0 | Status: DISCONTINUED | OUTPATIENT
Start: 2025-03-28 | End: 2025-03-30

## 2025-03-28 RX ORDER — OXYCODONE HYDROCHLORIDE 30 MG/1
5 TABLET ORAL
Refills: 0 | Status: DISCONTINUED | OUTPATIENT
Start: 2025-03-28 | End: 2025-03-28

## 2025-03-28 RX ORDER — NALOXONE HYDROCHLORIDE 0.4 MG/ML
0.1 INJECTION, SOLUTION INTRAMUSCULAR; INTRAVENOUS; SUBCUTANEOUS
Refills: 0 | Status: DISCONTINUED | OUTPATIENT
Start: 2025-03-28 | End: 2025-03-30

## 2025-03-28 RX ORDER — BUTORPHANOL TARTRATE 1 MG/ML
0.25 INJECTION, SOLUTION INTRAMUSCULAR; INTRAVENOUS EVERY 6 HOURS
Refills: 0 | Status: DISCONTINUED | OUTPATIENT
Start: 2025-03-28 | End: 2025-03-28

## 2025-03-28 RX ORDER — MODIFIED LANOLIN 100 %
1 CREAM (GRAM) TOPICAL EVERY 6 HOURS
Refills: 0 | Status: DISCONTINUED | OUTPATIENT
Start: 2025-03-28 | End: 2025-03-30

## 2025-03-28 RX ORDER — ONDANSETRON HCL/PF 4 MG/2 ML
4 VIAL (ML) INJECTION EVERY 6 HOURS
Refills: 0 | Status: DISCONTINUED | OUTPATIENT
Start: 2025-03-28 | End: 2025-03-30

## 2025-03-28 RX ORDER — OXYTOCIN-SODIUM CHLORIDE 0.9% IV SOLN 30 UNIT/500ML 30-0.9/5 UT/ML-%
42 SOLUTION INTRAVENOUS
Qty: 30 | Refills: 0 | Status: DISCONTINUED | OUTPATIENT
Start: 2025-03-28 | End: 2025-03-30

## 2025-03-28 RX ORDER — INFLUENZA A VIRUS A/IDAHO/07/2018 (H1N1) ANTIGEN (MDCK CELL DERIVED, PROPIOLACTONE INACTIVATED, INFLUENZA A VIRUS A/INDIANA/08/2018 (H3N2) ANTIGEN (MDCK CELL DERIVED, PROPIOLACTONE INACTIVATED), INFLUENZA B VIRUS B/SINGAPORE/INFTT-16-0610/2016 ANTIGEN (MDCK CELL DERIVED, PROPIOLACTONE INACTIVATED), INFLUENZA B VIRUS B/IOWA/06/2017 ANTIGEN (MDCK CELL DERIVED, PROPIOLACTONE INACTIVATED) 15; 15; 15; 15 UG/.5ML; UG/.5ML; UG/.5ML; UG/.5ML
0.5 INJECTION, SUSPENSION INTRAMUSCULAR ONCE
Refills: 0 | Status: DISCONTINUED | OUTPATIENT
Start: 2025-03-28 | End: 2025-03-30

## 2025-03-28 RX ORDER — OXYCODONE HYDROCHLORIDE 30 MG/1
5 TABLET ORAL ONCE
Refills: 0 | Status: DISCONTINUED | OUTPATIENT
Start: 2025-03-28 | End: 2025-03-30

## 2025-03-28 RX ORDER — NALBUPHINE HYDROCHLORIDE 10 MG/ML
2.5 INJECTION INTRAMUSCULAR; INTRAVENOUS; SUBCUTANEOUS EVERY 6 HOURS
Refills: 0 | Status: DISCONTINUED | OUTPATIENT
Start: 2025-03-28 | End: 2025-03-30

## 2025-03-28 RX ORDER — SODIUM CHLORIDE 9 G/1000ML
1000 INJECTION, SOLUTION INTRAVENOUS
Refills: 0 | Status: DISCONTINUED | OUTPATIENT
Start: 2025-03-28 | End: 2025-03-30

## 2025-03-28 RX ORDER — IBUPROFEN 200 MG
600 TABLET ORAL EVERY 6 HOURS
Refills: 0 | Status: COMPLETED | OUTPATIENT
Start: 2025-03-28 | End: 2026-02-24

## 2025-03-28 RX ORDER — SODIUM CHLORIDE 9 G/1000ML
500 INJECTION, SOLUTION INTRAVENOUS ONCE
Refills: 0 | Status: DISCONTINUED | OUTPATIENT
Start: 2025-03-28 | End: 2025-03-30

## 2025-03-28 RX ORDER — DIPHENHYDRAMINE HCL 12.5MG/5ML
25 ELIXIR ORAL EVERY 6 HOURS
Refills: 0 | Status: DISCONTINUED | OUTPATIENT
Start: 2025-03-28 | End: 2025-03-30

## 2025-03-28 RX ORDER — SODIUM CHLORIDE 9 G/1000ML
1000 INJECTION, SOLUTION INTRAVENOUS ONCE
Refills: 0 | Status: DISCONTINUED | OUTPATIENT
Start: 2025-03-28 | End: 2025-03-30

## 2025-03-28 RX ORDER — KETOROLAC TROMETHAMINE 30 MG/ML
30 INJECTION, SOLUTION INTRAMUSCULAR; INTRAVENOUS EVERY 6 HOURS
Refills: 0 | Status: COMPLETED | OUTPATIENT
Start: 2025-03-28 | End: 2025-03-29

## 2025-03-28 RX ORDER — CITRIC ACID/SODIUM CITRATE 300-500 MG
30 SOLUTION, ORAL ORAL ONCE
Refills: 0 | Status: COMPLETED | OUTPATIENT
Start: 2025-03-28 | End: 2025-03-28

## 2025-03-28 RX ORDER — SIMETHICONE 80 MG
80 TABLET,CHEWABLE ORAL EVERY 4 HOURS
Refills: 0 | Status: DISCONTINUED | OUTPATIENT
Start: 2025-03-28 | End: 2025-03-30

## 2025-03-28 RX ORDER — OXYCODONE HYDROCHLORIDE 30 MG/1
10 TABLET ORAL
Refills: 0 | Status: DISCONTINUED | OUTPATIENT
Start: 2025-03-28 | End: 2025-03-28

## 2025-03-28 RX ADMIN — OXYTOCIN-SODIUM CHLORIDE 0.9% IV SOLN 30 UNIT/500ML 42 MILLIUNIT(S)/MIN: 30-0.9/5 SOLUTION at 17:22

## 2025-03-28 RX ADMIN — SODIUM CHLORIDE 200 MILLILITER(S): 9 INJECTION, SOLUTION INTRAVENOUS at 13:29

## 2025-03-28 RX ADMIN — Medication 1 APPLICATION(S): at 12:02

## 2025-03-28 RX ADMIN — SODIUM CHLORIDE 1000 MILLILITER(S): 9 INJECTION, SOLUTION INTRAVENOUS at 17:22

## 2025-03-28 RX ADMIN — Medication 30 MILLILITER(S): at 13:30

## 2025-03-28 RX ADMIN — Medication 4 MILLIGRAM(S): at 19:35

## 2025-03-28 RX ADMIN — SODIUM CHLORIDE 200 MILLILITER(S): 9 INJECTION, SOLUTION INTRAVENOUS at 17:23

## 2025-03-28 RX ADMIN — DEXAMETHASONE 4 MILLIGRAM(S): 0.5 TABLET ORAL at 19:35

## 2025-03-28 RX ADMIN — Medication 10 MILLIGRAM(S): at 23:11

## 2025-03-28 NOTE — OB RN INTRAOPERATIVE NOTE - NS_CULTURES_OBGYN_ALL_OB
Pt alert and oriented. Aware of POC. Ambulatory in room. O2 @ 4L nasal cannula. Afebrile. Denies current pain. C/O increased SOB with ambulation.  Heparin gtt. Blood glucose readings WNL. IV antibiotics continued. IV iron given this shift. Telemetry monitoring. Bed in lowest position. Call light/ needed items placed within patient reach. Safety maintained.   No

## 2025-03-28 NOTE — OB RN DELIVERY SUMMARY - NSSELHIDDEN_OBGYN_ALL_OB_FT
[NS_DeliveryAttending1_OBGYN_ALL_OB_FT:[id number]=],[NS_DeliveryAssist1_OBGYN_ALL_OB_FT:[id number]==],[NS_DeliveryRN_OBGYN_ALL_OB_FT:[id number]==]

## 2025-03-28 NOTE — BRIEF OPERATIVE NOTE - NSICDXBRIEFPOSTOP_GEN_ALL_CORE_FT
POST-OP DIAGNOSIS:  Delivery by  section using transverse incision of lower segment of uterus 28-Mar-2025 16:17:08  Dulce Vaughan

## 2025-03-28 NOTE — OB PROVIDER DELIVERY SUMMARY - NSPROVIDERDELIVERYNOTE_OBGYN_ALL_OB_FT
scheduled repeat  section;  present intraoperatively   Viable female infant, apgars 8/8, weight 3280 g  Hysterotomy closed in 1 layer using Maxon suture  Fascia was closed using 0-Vicryl suture  Grossly normal uterus, tubes, and ovaries  Subcutaneous tissue was reapproximated in running fashion using Vicryl suture in 2 layers  Skin was closed in subcuticular fashion.    Patient and infant to recovery in stable condition    QBL: 177 cc  IVF: 2000 cc    UOP: 150 cc  dictation #    Dulce Vaughan CNM scheduled repeat  section;  present intraoperatively   Viable female infant, Apgar 8/8, weight 3280 g  Hysterotomy closed in 1 layer using Maxon suture  Fascia was closed using 0-Vicryl suture  Grossly normal uterus, tubes, and ovaries  Subcutaneous tissue was reapproximated in running fashion using Vicryl suture in 2 layers  Skin was closed in subcuticular fashion.    Patient and infant to recovery in stable condition    QBL: 177 cc  IVF: 2000 cc    UOP: 150 cc  dictation #[id number]    Dulce Vaughan CNM

## 2025-03-28 NOTE — BRIEF OPERATIVE NOTE - OPERATION/FINDINGS
scheduled repeat  section;  present intraoperatively   Viable female infant, Apgar 8/8, weight 3280 g  Hysterotomy closed in 1 layer using Maxon suture  Fascia was closed using 0-Vicryl suture  Grossly normal uterus, tubes, and ovaries  Subcutaneous tissue was reapproximated in running fashion using Vicryl suture in 2 layers  Skin was closed in subcuticular fashion.    Patient and infant to recovery in stable condition    QBL: 177 cc  IVF: 2000 cc    UOP: 150 cc  dictation #[id number]

## 2025-03-28 NOTE — OB PROVIDER H&P - NSICDXPASTMEDICALHX_GEN_ALL_CORE_FT
PAST MEDICAL HISTORY:  Congenital deafness     Maternal care for low transverse scar from previous  delivery     Unplanned pregnancy

## 2025-03-28 NOTE — OB RN PATIENT PROFILE - BREAST MILK SUPPORTS STABLE NEWBORN BLOOD SUGAR
[Nasal Discharge] : nasal discharge [Postnasal Drip] : postnasal drip [Joint Pain] : joint pain [Joint Stiffness] : joint stiffness [Negative] : Heme/Lymph Statement Selected

## 2025-03-28 NOTE — DISCHARGE NOTE OB - PATIENT PORTAL LINK FT
You can access the FollowMyHealth Patient Portal offered by [facility] by registering at the following website: [email or web address]. By joining [employer]’s FollowMyHealth portal, you will also be able to view your health information using other applications (apps) compatible with our system.

## 2025-03-28 NOTE — OB PROVIDER H&P - NSHPPHYSICALEXAM_GEN_ALL_CORE
T(C): 36.9  HR: 115 (03-28-25 @ 12:18) (115 - 115)  BP: 132/81 (03-28-25 @ 12:18) (132/81 - 132/81)  RR: 15    A&Ox3  Heart: RRR, S1&S2, no S3  Lungs: Clear bilateral to auscultation, good inspiratory /expiratory effort              no rhonchi, no rales  Abd: soft, Gravid, TOCO in place           +Pfannenstiel scar  Bedside Targeted Limited Transabdominal Sonogram: cephalic, anterior placenta   Ext:  FROM /bilateral  1+ LE edema   Neuro: grossly intact    NST  Baseline  (    135      ) BPM  Variability ( x )  Moderate   (  ) Minimal  (  ) Absent  (  )  Marked  Accelerations (x  ) 15x15   (  ) 10x10  (  ) no  Decelerations (  x) no  (  ) Variable  (  ) Early  (  ) Late      Description _________  Contractions (  ) no  (  x) yes     Description  ____irregular mild______  Interpretation (  x) reactive   (  )  non-reactive

## 2025-03-28 NOTE — OB RN PATIENT PROFILE - FALL HARM RISK - UNIVERSAL INTERVENTIONS
Bed in lowest position, wheels locked, appropriate side rails in place/Call bell, personal items and telephone in reach/Instruct patient to call for assistance before getting out of bed or chair/Non-slip footwear when patient is out of bed/[place] to call system/Physically safe environment - no spills, clutter or unnecessary equipment/Purposeful Proactive Rounding/Room/bathroom lighting operational, light cord in reach

## 2025-03-28 NOTE — OB PROVIDER H&P - ASSESSMENT
A: 26 yo , EGA@41.1 weeks, scheduled for repeat  section.  P: - Admit to PACU  - NPO  - NST  - IV access, CBC/T&C/RPR  - Pepcid and Bicitra as per pre-op policy  - Anesthesia consult   Risks, benefits, alternatives, and possible complications have been discussed in detail by Dr Garza with the patient in her native language. Pre-admission, admission, and post-admission procedures and expectations were discussed in detail. All questions answered, all appropriate hospital consents were signed.  Anticipate  section.   - Discussed with Dr. Miguel Angel Vaughan CNM

## 2025-03-28 NOTE — OB PROVIDER H&P - HISTORY OF PRESENT ILLNESS
26 yo , EGA@41.1 weeks presented for scheduled repeat  section.  Interview was conducted via ASC .   Patient denies contractions, leaking fluid, vaginal bleeding, reports + fetal movements.  Prenatal course is uncomplicated.  - EFW 3400 g  - GBS negative

## 2025-03-28 NOTE — DISCHARGE NOTE OB - NS MD DC FALL RISK RISK
For information on Fall & Injury Prevention, visit: [email or web address] OR  [email or web address] OR  https://www.cdc.gov/steadi/patient.html

## 2025-03-28 NOTE — OB PROVIDER H&P - NS_FHRACCEL_OBGYN_ALL_OB
Pt w/ Chronic Cholecystitic s/p diagnostic laparoscopy w/ aborted cholecystectomy 2/2 obstructive adhesions (4/21/22, Dr. Najera)  - CT A/P: Distended gallbladder containing gallstones with mild gallbladder wall   edema, likely in keeping with reported acute cholecystitis. No CT   findings of perforation at this time  - s/p Zosyn x1, meropenem startedf today  - ID consulted, f/u recs  - Surgery consulted
Pt w/ Chronic Cholecystitic s/p diagnostic laparoscopy w/ aborted cholecystectomy 2/2 obstructive adhesions (4/21/22, Dr. Najera)  - CT A/P: Distended gallbladder containing gallstones with mild gallbladder wall   edema, likely in keeping with reported acute cholecystitis. No CT   findings of perforation at this time  - s/p Zosyn x1  - ID consulted, f/u recs  - Surgery consulted
Present (15 x15 bpm)

## 2025-03-28 NOTE — OB PROVIDER H&P - ATTENDING COMMENTS
Patient seen and examined, agree with above. Plan for repeat CS. R/b/a discussed with risks including bleeding, infection, damage to surrounding anatomic structures. Consents signed. Proceed to OR when available.     Patient counseled with assistance of  Tara Wade MD

## 2025-03-28 NOTE — OB PROVIDER H&P - NSLOWPPHRISK_OBGYN_A_OB
You Pregnancy/Less than or equal to 4 previous vaginal births/No known bleeding disorder/No history of postpartum hemorrhage

## 2025-03-28 NOTE — DISCHARGE NOTE OB - PLAN OF CARE
After discharge, please stay on pelvic rest for 6 weeks, meaning no sexual intercourse, no tampons and no douching.  No exercise or lifting objects heavier than baby for 4-6wks.  Expect to have vaginal bleeding/spotting for up to six weeks.  The bleeding should get lighter and more white/light brown with time.  For bleeding soaking more than a pad an hour or passing clots greater than the size of your fist, come in to the emergency department.    Follow up with your doctor in 2 weeks for incision check.  Call your doctor for noticeable increase in redness or swelling at incision, discharge from incision, or opening of skin at incision site.

## 2025-03-28 NOTE — DISCHARGE NOTE OB - CARE PLAN
Principal Discharge DX:	 delivery delivered  Assessment and plan of treatment:	After discharge, please stay on pelvic rest for 6 weeks, meaning no sexual intercourse, no tampons and no douching.  No exercise or lifting objects heavier than baby for 4-6wks.  Expect to have vaginal bleeding/spotting for up to six weeks.  The bleeding should get lighter and more white/light brown with time.  For bleeding soaking more than a pad an hour or passing clots greater than the size of your fist, come in to the emergency department.    Follow up with your doctor in 2 weeks for incision check.  Call your doctor for noticeable increase in redness or swelling at incision, discharge from incision, or opening of skin at incision site.   1

## 2025-03-28 NOTE — OB NEONATOLOGY/PEDIATRICIAN DELIVERY SUMMARY - NSPEDSNEONOTESA_OBGYN_ALL_OB_FT
Peds not present at delivery, called for initiation of CPAP at 9 MOL.  41,1 wk AGA female born via rpt CS to a 27 y/o  mother. Mother of child is deaf. Pregnancy uncomplicated. Maternal labs include Blood Type O+, HIV - , RPR NR , Rubella I , Hep B - , GBS + (). ROM at TOD with clear fluids (ROM hours: ~1 min). Baby emerged vigorous, crying, was warmed, dried, suctioned, and stimulated with APGARS of 7/8. CPAP initiated at 9 MOL for saturations in the 70s and poor color. Peds arrived to delivery by 10 MOL, CPAP continued for increased WOB until 35 MOL, max settings of 5/50%. CPAP successfully weaned. Mom plans to initiate bottle feed, declines Hep B vaccine. Highest maternal temp: 37.1 C. EOS 0.11.     3/28  TOB 1438   BW 3280    Physical Exam:  Gen: NAD, +grimace  HEENT: anterior fontanel open soft and flat, ears normal set. nares clinically patent  Resp: no increased work of breathing, good air entry b/l, clear to auscultation bilaterally  Cardio: normal S1/S2, regular rate and rhythm, no murmur appreciated  Abd: soft, non tender, non distended, umbilical cord with 3 vessels  Back: spine midline, no sacral dimple or tuft of hair  Neuro: +grasp/suck/brittani/palmar/plantar, normal tone  Extremities: moving all extremities, full range of motion x 4  Skin: pink, warm, acrocyanosis  Genitals: Normal female anatomy, Guy 1, anus patent

## 2025-03-28 NOTE — OB RN INTRAOPERATIVE NOTE - NSSELHIDDEN_OBGYN_ALL_OB_FT
[NS_DeliveryAttending1_OBGYN_ALL_OB_FT:[id number]],[NS_DeliveryAssist1_OBGYN_ALL_OB_FT:[id number]],[NS_DeliveryRN_OBGYN_ALL_OB_FT:[id number]]

## 2025-03-28 NOTE — DISCHARGE NOTE OB - MEDICATION SUMMARY - MEDICATIONS TO TAKE
I will START or STAY ON the medications listed below when I get home from the hospital:    ibuprofen 600 mg oral tablet  -- 1 tab(s) by mouth every 6 hours as needed for  moderate pain  -- Indication: For  delivery delivered    acetaminophen 325 mg oral tablet  -- 3 tab(s) by mouth every 6 hours as needed for  mild pain  -- Indication: For  delivery delivered    Prenatal Multivitamins oral tablet  -- 1 tab(s) by mouth once a day  -- Indication: For nutrition

## 2025-03-28 NOTE — OB PROVIDER H&P - NS_OBGYNHISTORY_OBGYN_ALL_OB_FT
OB hx: 3/1/2021,  section, breech, 7lbs, uncomplicated            , TOP, 1st trimester, no D&C            , TOP, 1st trimester,  D&C  GYN hx: reports hx of abnormal Papsmear, +HPV, denies cysts/fibroids/STDs

## 2025-03-28 NOTE — OB RN INTRAOPERATIVE NOTE - NS_ELECTROSURGICALUNITBIOMEDNUMBER_OBGYN_ALL_OB_FT
Chief Complaint   Patient presents with   • Urgent Care Follow Up     Left inguinal hernia   • Office Visit   • Imm/Inj     Flu vaccine       HISTORY    The patient is a 85 year old female who presents  for complaint of pain in the leg.  She states she has pain that originates in the buttock region on the right side than goes down and affects the lateral aspect of the right leg.  She has had this in the past and has a history of spinal stenosis.  She has gotten good results from corticosteroid shots in the past.  She would like to be referred out pain management have these repeated.    Patient was seen in urgent care recently and diagnosed with inguinal hernia.  She has appropriate referral to the general surgeon which she will see see him Friday.    Past Medical History:   Diagnosis Date   • Anxiety    • Bilateral sensorineural hearing loss    • Breast nodule     right   • Cataract, bilateral    • Colon polyp    • DDD (degenerative disc disease), lumbar    • Foraminal stenosis of lumbar region    • Hemorrhoids    • Hyperlipidemia    • Macular degeneration of left eye    • Osteoarthritis    • Osteoporosis    • Panic disorder    • Sciatica of left side    • Shingles     x2   • Skin tags, multiple acquired    • Wrist fracture     early 1980s and 1990s bilateral       Past Surgical History:   Procedure Laterality Date   • Abdomen surgery      tumor removal   • Colonoscopy  09/09/1993   • Colonoscopy  09/09/2003   • Eye surgery      left and right repair 2007   • Hysterectomy  1983    with bladder lift   • Mammo screening bilateral  02/08/2013   • Polypectomy  04/21/2000   • Stress echocardiogram  12/2007    mild prolapse of the posterior Mv leaflet, normal stress    • Transforaminal eda multi level Right 10/16/2019    L4-L5       Social History     Socioeconomic History   • Marital status:      Spouse name: Not on file   • Number of children: Not on file   • Years of education: Not on file   • Highest education  level: Not on file   Occupational History   • Not on file   Social Needs   • Financial resource strain: Not on file   • Food insecurity     Worry: Not on file     Inability: Not on file   • Transportation needs     Medical: Not on file     Non-medical: Not on file   Tobacco Use   • Smoking status: Never Smoker   • Smokeless tobacco: Never Used   Substance and Sexual Activity   • Alcohol use: No     Alcohol/week: 0.0 standard drinks   • Drug use: No   • Sexual activity: Never   Lifestyle   • Physical activity     Days per week: 3 days     Minutes per session: 10 min   • Stress: Very much   Relationships   • Social connections     Talks on phone: More than three times a week     Gets together: More than three times a week     Attends Congregational service: More than 4 times per year     Active member of club or organization: Yes     Attends meetings of clubs or organizations: More than 4 times per year     Relationship status:    • Intimate partner violence     Fear of current or ex partner: No     Emotionally abused: No     Physically abused: No     Forced sexual activity: No   Other Topics Concern   • Not on file   Social History Narrative     in 2012       Family History   Problem Relation Age of Onset   • Hypertension Father    • Aneurysm Father    • Myocardial Infarction Father    • Hypertension Mother    • Dementia/Alzheimers Mother    • Thyroid Mother    • Cancer Sister 40        colon   • Hypertension Brother    • Asthma Brother    • Hypertension Sister        Patient Active Problem List   Diagnosis   • Osteoporosis   • Arthritis   • Anxiety   • Hyperlipidemia   • Macular degeneration of left eye   • Benign essential hypertension   • Right shoulder pain   • DDD (degenerative disc disease), lumbar   • Foraminal stenosis of lumbar region   • Lumbar radiculopathy   • Synovial cyst of lumbar facet joint   • Spinal stenosis of lumbar region with neurogenic claudication   • Primary insomnia       Current  Outpatient Medications   Medication Sig   • traZODone (DESYREL) 50 MG tablet Take 2 and 1/2 tablets (125mg) by mouth nightly.   • PARoxetine (PAXIL) 10 MG tablet Take 1/2 tablet by mouth every morning.   • losartan (COZAAR) 25 MG tablet Take 1 tablet by mouth daily.   • Incontinence Supply Disposable (BLADDER CONTROL PADS EX ABSORB) Misc    • acetaminophen (TYLENOL) 500 MG tablet Take 2 tablets by mouth every 8 hours as needed for Pain.   • melatonin 3 MG Take 3 mg by mouth nightly.   • Multiple Vitamins-Minerals (MULTIVITAMIN PO)    • Calcium Carb-Cholecalciferol (CALCIUM 600+D) 600-800 MG-UNIT Tab Take 1 tablet by mouth 2 times daily.     No current facility-administered medications for this visit.        Allergies as of 10/06/2020 - Reviewed 10/06/2020   Allergen Reaction Noted   • Augmentin [amoxicillin-pot clavulanate] RASH 12/16/2015   • Lisinopril Cough 05/27/2016   • Sulfa antibiotics  01/15/2014       ROS  See HPI  PHYSICAL EXAM  Vitals: Blood pressure 112/78, pulse 94, temperature 97.5 °F (36.4 °C), temperature source Temporal, resp. rate 16, height 5' 8\" (1.727 m), weight 51.8 kg, SpO2 95 %.  Pleasant lean elderly female who is hard of hearing in no acute distress  On back exam there is decreased lumbar lordosis.  She has pain over the sciatic notch area on the right side.  Neurologic:  The straight leg raise was positive on the right side and cross straight leg raise was negative  The L5-S1 on strength are intact.  DTRs were 1+ symmetrical at the knees and mostly absent at the ankles.  Lower extremity exam shows is easily palpable peripheral pulses at the posterior tibial and dorsalis pedis on the right side.    ASSESSMENT  1. Need for vaccination    2. Foraminal stenosis of lumbar region, on exam the presentation is consistent with right-sided sciatica.  However given her history of response to corticosteroid injections and known lumbar stenosis referral is appropriate         PLAN    1. Referral to  pain management  2. We discussed that she may need to come back if she is going to have surgery to have medical clearance  3. Influenza vaccine offered except the patient     Nhung Martines MD, FAAFP   [id number] verbal cues

## 2025-03-28 NOTE — DISCHARGE NOTE OB - FINANCIAL ASSISTANCE
[facility] provides services at a reduced cost to those who are determined to be eligible through [facility]’s financial assistance program. Information regarding [facility]’s financial assistance program can be found by going to [email or web address] or by calling [phone].

## 2025-03-28 NOTE — OB RN PATIENT PROFILE - NSSDOHFOODLAST_OBGYN_A_OB
PAST SURGICAL HISTORY:  Acute gastric volvulus s/p laparoscopic reduction, PEG that was later reversed    Hernia, paraesophageal     Prostate Brachytherapy 30y ago at [facility] - had seeds    S/P appendectomy performed at [facility] 10 y ago    
never true

## 2025-03-28 NOTE — DISCHARGE NOTE OB - CARE PROVIDER_API CALL
Edilia Michelle  Obstetrics and Gynecology  [address], [address]  [place] [address]  Phone: [phone]  Fax: [phone]  Follow Up Time:

## 2025-03-28 NOTE — OB PROVIDER DELIVERY SUMMARY - NSSELHIDDEN_OBGYN_ALL_OB_FT
[NS_DeliveryAttending1_OBGYN_ALL_OB_FT:[id number]],[NS_DeliveryAssist1_OBGYN_ALL_OB_FT:[id number]]

## 2025-03-28 NOTE — BRIEF OPERATIVE NOTE - FIRST ASSIST NAME
[Dear  ___] : Dear  [unfilled], [Consult Letter:] : I had the pleasure of evaluating your patient, [unfilled]. [Please see my note below.] : Please see my note below. [Consult Closing:] : Thank you very much for allowing me to participate in the care of this patient.  If you have any questions, please do not hesitate to contact me. [Sincerely,] : Sincerely, [FreeTextEntry3] : Carlos Dulce Vaughan (Certified Nurse Midwife)

## 2025-03-29 LAB
BASOPHILS # BLD AUTO: 0.01 K/UL — SIGNIFICANT CHANGE UP (ref 0–0.2)
BASOPHILS NFR BLD AUTO: 0.1 % — SIGNIFICANT CHANGE UP (ref 0–2)
EOSINOPHIL # BLD AUTO: 0 K/UL — SIGNIFICANT CHANGE UP (ref 0–0.5)
EOSINOPHIL NFR BLD AUTO: 0 % — SIGNIFICANT CHANGE UP (ref 0–6)
HCT VFR BLD CALC: 30.8 % — LOW (ref 34.5–45)
HGB BLD-MCNC: 10.4 G/DL — LOW (ref 11.5–15.5)
IANC: 10.31 K/UL — HIGH (ref 1.8–7.4)
IMM GRANULOCYTES NFR BLD AUTO: 0.5 % — SIGNIFICANT CHANGE UP (ref 0–0.9)
LYMPHOCYTES # BLD AUTO: 1.9 K/UL — SIGNIFICANT CHANGE UP (ref 1–3.3)
LYMPHOCYTES # BLD AUTO: 14.3 % — SIGNIFICANT CHANGE UP (ref 13–44)
MCHC RBC-ENTMCNC: 30.8 PG — SIGNIFICANT CHANGE UP (ref 27–34)
MCHC RBC-ENTMCNC: 33.8 G/DL — SIGNIFICANT CHANGE UP (ref 32–36)
MCV RBC AUTO: 91.1 FL — SIGNIFICANT CHANGE UP (ref 80–100)
MONOCYTES # BLD AUTO: 0.98 K/UL — HIGH (ref 0–0.9)
MONOCYTES NFR BLD AUTO: 7.4 % — SIGNIFICANT CHANGE UP (ref 2–14)
NEUTROPHILS # BLD AUTO: 10.31 K/UL — HIGH (ref 1.8–7.4)
NEUTROPHILS NFR BLD AUTO: 77.7 % — HIGH (ref 43–77)
NRBC # BLD AUTO: 0 K/UL — SIGNIFICANT CHANGE UP (ref 0–0)
NRBC # FLD: 0 K/UL — SIGNIFICANT CHANGE UP (ref 0–0)
NRBC BLD AUTO-RTO: 0 /100 WBCS — SIGNIFICANT CHANGE UP (ref 0–0)
PLATELET # BLD AUTO: 237 K/UL — SIGNIFICANT CHANGE UP (ref 150–400)
RBC # BLD: 3.38 M/UL — LOW (ref 3.8–5.2)
RBC # FLD: 14 % — SIGNIFICANT CHANGE UP (ref 10.3–14.5)
T PALLIDUM AB TITR SER: NEGATIVE — SIGNIFICANT CHANGE UP
WBC # BLD: 13.27 K/UL — HIGH (ref 3.8–10.5)
WBC # FLD AUTO: 13.27 K/UL — HIGH (ref 3.8–10.5)

## 2025-03-29 RX ORDER — IBUPROFEN 200 MG
600 TABLET ORAL EVERY 6 HOURS
Refills: 0 | Status: DISCONTINUED | OUTPATIENT
Start: 2025-03-29 | End: 2025-03-30

## 2025-03-29 RX ORDER — SODIUM CHLORIDE 9 G/1000ML
500 INJECTION, SOLUTION INTRAVENOUS ONCE
Refills: 0 | Status: COMPLETED | OUTPATIENT
Start: 2025-03-29 | End: 2025-03-29

## 2025-03-29 RX ADMIN — Medication 600 MILLIGRAM(S): at 13:20

## 2025-03-29 RX ADMIN — Medication 975 MILLIGRAM(S): at 03:00

## 2025-03-29 RX ADMIN — HEPARIN SODIUM 5000 UNIT(S): 1000 INJECTION INTRAVENOUS; SUBCUTANEOUS at 14:56

## 2025-03-29 RX ADMIN — Medication 975 MILLIGRAM(S): at 02:15

## 2025-03-29 RX ADMIN — SODIUM CHLORIDE 1000 MILLILITER(S): 9 INJECTION, SOLUTION INTRAVENOUS at 01:14

## 2025-03-29 RX ADMIN — SODIUM CHLORIDE 1000 MILLILITER(S): 9 INJECTION, SOLUTION INTRAVENOUS at 03:20

## 2025-03-29 RX ADMIN — Medication 600 MILLIGRAM(S): at 18:20

## 2025-03-29 RX ADMIN — Medication 600 MILLIGRAM(S): at 12:22

## 2025-03-29 RX ADMIN — HEPARIN SODIUM 5000 UNIT(S): 1000 INJECTION INTRAVENOUS; SUBCUTANEOUS at 02:17

## 2025-03-29 RX ADMIN — Medication 975 MILLIGRAM(S): at 21:28

## 2025-03-29 RX ADMIN — Medication 600 MILLIGRAM(S): at 17:26

## 2025-03-29 RX ADMIN — Medication 975 MILLIGRAM(S): at 22:30

## 2025-03-29 NOTE — PROVIDER CONTACT NOTE (OTHER) - ASSESSMENT
Urine output as per flowsheet. fundus firm & midline, moderate bleeding.
A&Ox3. Pt offers no complaints via . Upon hourly check of conroy catheter, urine noted to be low and concentrated. Output 100c/hr. Fundus firm, midline, at umbilicus, mod lochia.

## 2025-03-29 NOTE — PROGRESS NOTE ADULT - ASSESSMENT
26y/o POD#1 from rLTCS. Pt noted to have low urine output now resolved. Pt currently stable.     #Postpartum  - Continue with po analgesia  - Increase ambulation  - Continue regular diet  - AM CBC   - DTV  - Incision dressing removed/ incision site c/d/i    Tessa Triana PGY1

## 2025-03-30 VITALS
HEART RATE: 80 BPM | SYSTOLIC BLOOD PRESSURE: 127 MMHG | TEMPERATURE: 98 F | OXYGEN SATURATION: 99 % | RESPIRATION RATE: 18 BRPM | DIASTOLIC BLOOD PRESSURE: 86 MMHG

## 2025-03-30 RX ORDER — ACETAMINOPHEN 500 MG/5ML
3 LIQUID (ML) ORAL
Qty: 0 | Refills: 0 | DISCHARGE
Start: 2025-03-30

## 2025-03-30 RX ORDER — IBUPROFEN 200 MG
1 TABLET ORAL
Qty: 0 | Refills: 0 | DISCHARGE
Start: 2025-03-30

## 2025-03-30 RX ADMIN — Medication 600 MILLIGRAM(S): at 06:15

## 2025-03-30 RX ADMIN — HEPARIN SODIUM 5000 UNIT(S): 1000 INJECTION INTRAVENOUS; SUBCUTANEOUS at 06:15

## 2025-03-30 NOTE — PROGRESS NOTE ADULT - SUBJECTIVE AND OBJECTIVE BOX
OB Attending Progress Note: TLCS, POD#2    S: 26yo POD#2 s/p LTCS. Her pain is well controlled. She is tolerating a regular diet and passing flatus. Voiding spontaneously. Denies N/V/D. Denies CP/SOB/lightheadedness/dizziness. She is breastfeeding.    O:  Vitals:  Vital Signs Last 24 Hrs  T(C): 36.8 (30 Mar 2025 04:56), Max: 36.8 (30 Mar 2025 04:56)  T(F): 98.2 (30 Mar 2025 04:56), Max: 98.2 (30 Mar 2025 04:56)  HR: 60 (30 Mar 2025 04:56) (60 - 95)  BP: 128/86 (30 Mar 2025 04:56) (96/76 - 128/86)  BP(mean): --  RR: 18 (30 Mar 2025 04:56) (18 - 18)  SpO2: 99% (30 Mar 2025 04:56) (99% - 100%)    Parameters below as of 30 Mar 2025 04:56  Patient On (Oxygen Delivery Method): room air        MEDICATIONS  (STANDING):  acetaminophen     Tablet .. 975 milliGRAM(s) Oral <User Schedule>  chlorhexidine 2% Cloths 1 Application(s) Topical daily  diphtheria/tetanus/pertussis (acellular) Vaccine (Adacel) 0.5 milliLiter(s) IntraMuscular once  famotidine Injectable 20 milliGRAM(s) IV Push once  heparin   Injectable 5000 Unit(s) SubCutaneous every 12 hours  ibuprofen  Tablet. 600 milliGRAM(s) Oral every 6 hours  influenza   Vaccine 0.5 milliLiter(s) IntraMuscular once  lactated ringers Bolus 500 milliLiter(s) IV Bolus once  lactated ringers Bolus 500 milliLiter(s) IV Bolus once  lactated ringers Bolus 1000 milliLiter(s) IV Bolus once  lactated ringers. 1000 milliLiter(s) (83 mL/Hr) IV Continuous <Continuous>  lactated ringers. 1000 milliLiter(s) (125 mL/Hr) IV Continuous <Continuous>  lactated ringers. 1000 milliLiter(s) (200 mL/Hr) IV Continuous <Continuous>  morphine PF Spinal 0.1 milliGRAM(s) IntraThecal. once  oxytocin Infusion 42 milliUNIT(s)/Min (42 mL/Hr) IV Continuous <Continuous>      MEDICATIONS  (PRN):  dexAMETHasone  Injectable 4 milliGRAM(s) IV Push every 6 hours PRN Nausea  diphenhydrAMINE 25 milliGRAM(s) Oral every 6 hours PRN Pruritus  lanolin Ointment 1 Application(s) Topical every 6 hours PRN Sore Nipples  magnesium hydroxide Suspension 30 milliLiter(s) Oral two times a day PRN Constipation  nalbuphine Injectable 2.5 milliGRAM(s) IV Push every 6 hours PRN Pruritus  naloxone Injectable 0.1 milliGRAM(s) IV Push every 3 minutes PRN For ANY of the following changes in patient status:  A. Breaths Per Minute LESS THAN 10, B. Oxygen saturation LESS THAN 90%, C. Sedation score of 6 for Stop After: 4 Times  ondansetron Injectable 4 milliGRAM(s) IV Push every 6 hours PRN Nausea  oxyCODONE    IR 5 milliGRAM(s) Oral every 3 hours PRN Moderate to Severe Pain (4-10)  oxyCODONE    IR 5 milliGRAM(s) Oral once PRN Moderate to Severe Pain (4-10)  simethicone 80 milliGRAM(s) Chew every 4 hours PRN Gas      Labs:  Blood type: O Positive  Rubella IgG: RPR: Negative                          10.4[L]   13.27[H] >-----------< 237    ( 03-29 @ 05:49 )             30.8[L]                        13.3   10.88[H] >-----------< 270    ( 03-28 @ 12:45 )             39.8                  PE:  General: NAD  CV: RR  Pulm: Breathing comfortably on RA  Abdomen: Soft, appropriately tender, incision c/d/i with dermabond.  Extremities: No erythema, no pitting edema    A/P: 26yo POD#2 s/p LTCS.  - Continue regular diet.  - Increase ambulation.  - Continue motrin, tylenol, oxycodone PRN for pain control.  - Discharge planning today per protocol    Dori Giles MD
POST OP DAY  1  s/p   SECTION    SUBJECTIVE:  Feeling comfortable currently. Had emesis x1 overnight which improved significantly with PRN antiemetic. Denies headache, lightheadedness/dizziness, nausea/emesis at this time. Ambulating. ( ID # [id number])    PAIN SCALE SCORE: [x] Refer to charted pain scores    THERAPY:  [ x ] Spinal morphine on 3/28/2025  [  ] Epidural morphine   [  ] IV PCA Hydromorphone 1 mg/ml    OBJECTIVE:  Comfortable Appearing    SEDATION SCORE:	  [ x ] Alert	    [  ] Drowsy        [  ] Arousable	[  ] Asleep	[  ] Unresponsive    Side Effects:	  [ x ] None	     [  ] Nausea        [  ] Pruritus        [  ] Weakness   [  ] Numbness        ASSESSMENT/ PLAN   [   ] Discontinue         [  ] Continue    [ x ] Change to prn Analgesics as per primary service.    DOCUMENTATION & VERIFICATION OF CURRENT MEDS [ x ] Done    COMMENTS: No Headache.  
R1 Progress Note    Patient seen and examined at bedside, no acute overnight events. No acute complaints, pain well controlled. Patient is ambulating and tolerating regular diet. Pt is passing flatus. Villagran removed overnight. Denies fever, chills, CP, SOB, N/V, HA, blurred vision.     Vital Signs Last 24 Hours  T(C): 36.9 (03-29-25 @ 06:30), Max: 37.1 (03-28-25 @ 13:22)  HR: 75 (03-29-25 @ 06:30) (74 - 115)  BP: 106/62 (03-29-25 @ 06:30) (85/63 - 132/81)  RR: 18 (03-29-25 @ 06:30) (14 - 20)  SpO2: 98% (03-29-25 @ 06:30) (96% - 98%)    I&O's Summary    28 Mar 2025 07:01  -  29 Mar 2025 06:47  --------------------------------------------------------  IN: 2250 mL / OUT: 942 mL / NET: 1308 mL        Physical Exam:  General: NAD  Abdomen: Soft, appropriately-tender, mildly-distended, fundus firm  Incision: Pfannenstiel incision CDI, subcuticular suture closure  Pelvic: Lochia wnl    Labs:    Blood Type: O Positive  Antibody Screen: Negative  RPR: Negative               13.3   10.88 )-----------( 270      ( 03-28 @ 12:45 )             39.8                12.2   10.28 )-----------( 306      ( 03-21 @ 18:45 )             36.1         MEDICATIONS  (STANDING):  acetaminophen     Tablet .. 975 milliGRAM(s) Oral <User Schedule>  chlorhexidine 2% Cloths 1 Application(s) Topical daily  diphtheria/tetanus/pertussis (acellular) Vaccine (Adacel) 0.5 milliLiter(s) IntraMuscular once  famotidine Injectable 20 milliGRAM(s) IV Push once  heparin   Injectable 5000 Unit(s) SubCutaneous every 12 hours  ibuprofen  Tablet. 600 milliGRAM(s) Oral every 6 hours  influenza   Vaccine 0.5 milliLiter(s) IntraMuscular once  ketorolac   Injectable 30 milliGRAM(s) IV Push every 6 hours  lactated ringers Bolus 500 milliLiter(s) IV Bolus once  lactated ringers Bolus 500 milliLiter(s) IV Bolus once  lactated ringers Bolus 1000 milliLiter(s) IV Bolus once  lactated ringers. 1000 milliLiter(s) (83 mL/Hr) IV Continuous <Continuous>  lactated ringers. 1000 milliLiter(s) (125 mL/Hr) IV Continuous <Continuous>  lactated ringers. 1000 milliLiter(s) (200 mL/Hr) IV Continuous <Continuous>  morphine PF Spinal 0.1 milliGRAM(s) IntraThecal. once  oxytocin Infusion 42 milliUNIT(s)/Min (42 mL/Hr) IV Continuous <Continuous>    MEDICATIONS  (PRN):  butorphanol Injectable 0.25 milliGRAM(s) IV Push every 6 hours PRN Pruritus  dexAMETHasone  Injectable 4 milliGRAM(s) IV Push every 6 hours PRN Nausea  diphenhydrAMINE 25 milliGRAM(s) Oral every 6 hours PRN Pruritus  lanolin Ointment 1 Application(s) Topical every 6 hours PRN Sore Nipples  magnesium hydroxide Suspension 30 milliLiter(s) Oral two times a day PRN Constipation  nalbuphine Injectable 2.5 milliGRAM(s) IV Push every 6 hours PRN Pruritus  naloxone Injectable 0.1 milliGRAM(s) IV Push every 3 minutes PRN For ANY of the following changes in patient status:  A. Breaths Per Minute LESS THAN 10, B. Oxygen saturation LESS THAN 90%, C. Sedation score of 6 for Stop After: 4 Times  ondansetron Injectable 4 milliGRAM(s) IV Push every 6 hours PRN Nausea  oxyCODONE    IR 5 milliGRAM(s) Oral every 3 hours PRN Mild Pain (1 - 3)  oxyCODONE    IR 10 milliGRAM(s) Oral every 3 hours PRN Moderate Pain (4 - 6)  oxyCODONE    IR 5 milliGRAM(s) Oral every 3 hours PRN Moderate to Severe Pain (4-10)  oxyCODONE    IR 5 milliGRAM(s) Oral once PRN Moderate to Severe Pain (4-10)  simethicone 80 milliGRAM(s) Chew every 4 hours PRN Gas

## 2025-04-01 PROBLEM — O34.211 MATERNAL CARE FOR LOW TRANSVERSE SCAR FROM PREVIOUS CESAREAN DELIVERY: Chronic | Status: ACTIVE | Noted: 2025-03-21

## 2025-04-14 ENCOUNTER — APPOINTMENT (OUTPATIENT)
Dept: OBGYN | Facility: CLINIC | Age: 25
End: 2025-04-14
Payer: MEDICAID

## 2025-04-14 VITALS
DIASTOLIC BLOOD PRESSURE: 86 MMHG | SYSTOLIC BLOOD PRESSURE: 121 MMHG | BODY MASS INDEX: 31.72 KG/M2 | WEIGHT: 168 LBS | HEIGHT: 61 IN

## 2025-04-14 PROCEDURE — 0503F POSTPARTUM CARE VISIT: CPT

## 2025-05-12 ENCOUNTER — APPOINTMENT (OUTPATIENT)
Dept: OBGYN | Facility: CLINIC | Age: 25
End: 2025-05-12

## 2025-06-16 NOTE — OB PST NOTE - NSICDXPASTMEDICALHX_GEN_ALL_CORE_FT
continue home cardizem. PAST MEDICAL HISTORY:  Congenital deafness     Maternal care for low transverse scar from previous  delivery     Unplanned pregnancy

## 2025-07-13 NOTE — OB RN PATIENT PROFILE - PRO INTERPRETER NEED 2
Repeat 10 yrs.        Lipid screening 02/16/2022     Priority: Low     Overview Note:     9/11/24 total 139 HDL 58 LDL 61  on ad alan diet  2/9/22 total 146 HDL 53 LDL 79 TG 72 on ad alan diet.      Labs were reviewed and discussed with the patient.   Consider repeat screening 3-5 years.      Environmental and seasonal allergies      Priority: Low     Overview Note:     Allergy symptoms controlled with Astelin and Flonase and Zyrtec and Singulair.      Obstructive sleep apnea 01/18/2022     Priority: Low     Overview Note:     2/9/22 [facility] home sleep study - mild TREVIN with AHI 8.2, lowest RA desaturation 78%, 11.4 minutes with O2 sat < 90%. APAP 4-20 cm ordered.      Patient describes chronic fatigue and daytime sleepiness.  Her BMI is greater than 40.  She has had difficult to control hypertension and issues with lower extremity edema.  Patient was offered APAP but declined due to insurance coverage related issues. The patient was educated regarding obstructive sleep apnea.  Discussed the dangers of noncompliance.  The patient was advised to never drive or engage in any other potentially hazardous activities when tired / sleepy.           Venous insufficiency of both lower extremities 01/18/2022     Priority: Low     Overview Note:     Patient describes chronic bilateral symmetric lower extremity edema.  Most likely related to her obesity and venous insufficiency.  She states that her symptoms were present even before her 2019 cardiac catheterization which revealed normal left ventricular function.  Continue Lasix PRN.  10/7/2022 vascular ultrasound left lower extremity negative DVT.      Osteoarthritis      Priority: Low     Overview Note:     Patient has widespread degenerative osteoarthritis.  Symptoms relieved with Celebrex and Neurontin.  She is status post bilateral knee replacement.  Continue current therapy.        Hypertension, essential 10/02/2015     Priority: Low     Overview Note:     Her blood  English

## (undated) DEVICE — WARMING BLANKET UPPER ADULT

## (undated) DEVICE — VACUUM CURETTE BUSSE HOSP CURVED 12MM

## (undated) DEVICE — VACUUM CURETTE BUSSE HOSP CURVED 11MM

## (undated) DEVICE — VACUUM CURETTE BERKLEY OLYMPUS CURVED 12MM

## (undated) DEVICE — VACUUM CURETTE BERKLEY OLYMPUS CURVED 7MM

## (undated) DEVICE — VACUUM CURETTE MEDGYN CURVED 13MM

## (undated) DEVICE — VACUUM CURETTE BUSSE HOSP CURVED 14MM

## (undated) DEVICE — TUBING UTERINE ASPIRATION 3/8" X 6FT W/O ADAPTER

## (undated) DEVICE — VACUUM CURETTE BERKLEY OLYMPUS CURVED 16MM X 1/2"

## (undated) DEVICE — VACUUM CURETTE BUSSE HOSP STRAIGHT 7MM

## (undated) DEVICE — POSITIONER FOAM EGG CRATE ULNAR 2PCS (PINK)

## (undated) DEVICE — VACUUM CURETTE BERKLEY OLYMPUS F TIP 6MM

## (undated) DEVICE — SOCK SPECIMEN 3/8"-1/2" MALE PORT

## (undated) DEVICE — VACUUM CURETTE BERKLEY OLYMPUS CURVED 8MM

## (undated) DEVICE — GLV 6.5 PROTEXIS (WHITE)

## (undated) DEVICE — VACUUM CURETTE BUSSE HOSP CURVED 9MM

## (undated) DEVICE — DRAPE LIGHT HANDLE COVER (GREEN)

## (undated) DEVICE — POSITIONER PATIENT SAFETY STRAP 3X60"

## (undated) DEVICE — PACK LITHOTOMY

## (undated) DEVICE — VACUUM CURETTE BERKLEY OLYMPUS CURVED 9MM

## (undated) DEVICE — VACUUM CURETTE BERKLEY OLYMPUS CURVED 11MM

## (undated) DEVICE — VACUUM CURETTE BUSSE HOSP CURVED 10MM

## (undated) DEVICE — DRSG DERMABOND PRINEO 22CM

## (undated) DEVICE — SOL IRR POUR NS 0.9% 500ML